# Patient Record
Sex: FEMALE | URBAN - METROPOLITAN AREA
[De-identification: names, ages, dates, MRNs, and addresses within clinical notes are randomized per-mention and may not be internally consistent; named-entity substitution may affect disease eponyms.]

---

## 2021-12-26 ENCOUNTER — EMERGENCY (EMERGENCY)
Facility: HOSPITAL | Age: 31
LOS: 1 days | Discharge: LEFT BEFORE DISCHARGE | End: 2021-12-26
Admitting: EMERGENCY MEDICINE
Payer: SELF-PAY

## 2021-12-26 VITALS
TEMPERATURE: 100 F | SYSTOLIC BLOOD PRESSURE: 140 MMHG | HEART RATE: 89 BPM | OXYGEN SATURATION: 98 % | DIASTOLIC BLOOD PRESSURE: 88 MMHG | RESPIRATION RATE: 18 BRPM

## 2021-12-26 PROCEDURE — L9991: CPT

## 2022-04-29 ENCOUNTER — APPOINTMENT (OUTPATIENT)
Dept: ANTEPARTUM | Facility: CLINIC | Age: 32
End: 2022-04-29

## 2022-04-29 PROBLEM — Z00.00 ENCOUNTER FOR PREVENTIVE HEALTH EXAMINATION: Status: ACTIVE | Noted: 2022-04-29

## 2022-05-24 ENCOUNTER — APPOINTMENT (OUTPATIENT)
Dept: MATERNAL FETAL MEDICINE | Facility: CLINIC | Age: 32
End: 2022-05-24

## 2022-05-26 ENCOUNTER — INPATIENT (INPATIENT)
Facility: HOSPITAL | Age: 32
LOS: 1 days | Discharge: LEFT BEFORE DISCHARGE | End: 2022-05-28
Attending: INTERNAL MEDICINE | Admitting: INTERNAL MEDICINE
Payer: MEDICAID

## 2022-05-26 VITALS
HEART RATE: 71 BPM | DIASTOLIC BLOOD PRESSURE: 64 MMHG | RESPIRATION RATE: 16 BRPM | TEMPERATURE: 97 F | OXYGEN SATURATION: 100 % | SYSTOLIC BLOOD PRESSURE: 119 MMHG

## 2022-05-26 DIAGNOSIS — Z29.9 ENCOUNTER FOR PROPHYLACTIC MEASURES, UNSPECIFIED: ICD-10-CM

## 2022-05-26 DIAGNOSIS — E80.6 OTHER DISORDERS OF BILIRUBIN METABOLISM: ICD-10-CM

## 2022-05-26 DIAGNOSIS — D64.9 ANEMIA, UNSPECIFIED: ICD-10-CM

## 2022-05-26 DIAGNOSIS — N39.0 URINARY TRACT INFECTION, SITE NOT SPECIFIED: ICD-10-CM

## 2022-05-26 DIAGNOSIS — R17 UNSPECIFIED JAUNDICE: ICD-10-CM

## 2022-05-26 LAB
ALBUMIN SERPL ELPH-MCNC: 3.7 G/DL — SIGNIFICANT CHANGE UP (ref 3.3–5)
ALP SERPL-CCNC: 48 U/L — SIGNIFICANT CHANGE UP (ref 40–120)
ALT FLD-CCNC: 18 U/L — SIGNIFICANT CHANGE UP (ref 4–33)
ANION GAP SERPL CALC-SCNC: 11 MMOL/L — SIGNIFICANT CHANGE UP (ref 7–14)
APTT BLD: 31.2 SEC — SIGNIFICANT CHANGE UP (ref 27–36.3)
AST SERPL-CCNC: 21 U/L — SIGNIFICANT CHANGE UP (ref 4–32)
BASOPHILS # BLD AUTO: 0.03 K/UL — SIGNIFICANT CHANGE UP (ref 0–0.2)
BASOPHILS NFR BLD AUTO: 0.3 % — SIGNIFICANT CHANGE UP (ref 0–2)
BILIRUB DIRECT SERPL-MCNC: 7.6 MG/DL — HIGH (ref 0–0.3)
BILIRUB INDIRECT FLD-MCNC: 1.2 MG/DL — HIGH (ref 0–1)
BILIRUB SERPL-MCNC: 8.8 MG/DL — HIGH (ref 0.2–1.2)
BILIRUB SERPL-MCNC: 8.8 MG/DL — HIGH (ref 0.2–1.2)
BUN SERPL-MCNC: 11 MG/DL — SIGNIFICANT CHANGE UP (ref 7–23)
CALCIUM SERPL-MCNC: 9.5 MG/DL — SIGNIFICANT CHANGE UP (ref 8.4–10.5)
CHLORIDE SERPL-SCNC: 104 MMOL/L — SIGNIFICANT CHANGE UP (ref 98–107)
CO2 SERPL-SCNC: 20 MMOL/L — LOW (ref 22–31)
CREAT ?TM UR-MCNC: 38 MG/DL — SIGNIFICANT CHANGE UP
CREAT SERPL-MCNC: 0.42 MG/DL — LOW (ref 0.5–1.3)
EGFR: 132 ML/MIN/1.73M2 — SIGNIFICANT CHANGE UP
EOSINOPHIL # BLD AUTO: 0.35 K/UL — SIGNIFICANT CHANGE UP (ref 0–0.5)
EOSINOPHIL NFR BLD AUTO: 3.2 % — SIGNIFICANT CHANGE UP (ref 0–6)
FLUAV AG NPH QL: SIGNIFICANT CHANGE UP
FLUBV AG NPH QL: SIGNIFICANT CHANGE UP
GLUCOSE SERPL-MCNC: 103 MG/DL — HIGH (ref 70–99)
HAPTOGLOB SERPL-MCNC: 41 MG/DL — SIGNIFICANT CHANGE UP (ref 34–200)
HAV IGM SER-ACNC: SIGNIFICANT CHANGE UP
HBV CORE IGM SER-ACNC: SIGNIFICANT CHANGE UP
HBV SURFACE AG SER-ACNC: REACTIVE
HCT VFR BLD CALC: 27.6 % — LOW (ref 34.5–45)
HCV AB S/CO SERPL IA: 0.12 S/CO — SIGNIFICANT CHANGE UP (ref 0–0.99)
HCV AB SERPL-IMP: SIGNIFICANT CHANGE UP
HGB BLD-MCNC: 9.5 G/DL — LOW (ref 11.5–15.5)
IANC: 7.88 K/UL — HIGH (ref 1.8–7.4)
IMM GRANULOCYTES NFR BLD AUTO: 0.7 % — SIGNIFICANT CHANGE UP (ref 0–1.5)
INR BLD: 1.05 RATIO — SIGNIFICANT CHANGE UP (ref 0.88–1.16)
LDH SERPL L TO P-CCNC: 155 U/L — SIGNIFICANT CHANGE UP (ref 135–225)
LDH SERPL L TO P-CCNC: 183 U/L — SIGNIFICANT CHANGE UP (ref 135–225)
LIDOCAIN IGE QN: 22 U/L — SIGNIFICANT CHANGE UP (ref 7–60)
LYMPHOCYTES # BLD AUTO: 19.2 % — SIGNIFICANT CHANGE UP (ref 13–44)
LYMPHOCYTES # BLD AUTO: 2.11 K/UL — SIGNIFICANT CHANGE UP (ref 1–3.3)
MCHC RBC-ENTMCNC: 31.7 PG — SIGNIFICANT CHANGE UP (ref 27–34)
MCHC RBC-ENTMCNC: 34.4 GM/DL — SIGNIFICANT CHANGE UP (ref 32–36)
MCV RBC AUTO: 92 FL — SIGNIFICANT CHANGE UP (ref 80–100)
MONOCYTES # BLD AUTO: 0.53 K/UL — SIGNIFICANT CHANGE UP (ref 0–0.9)
MONOCYTES NFR BLD AUTO: 4.8 % — SIGNIFICANT CHANGE UP (ref 2–14)
NEUTROPHILS # BLD AUTO: 7.88 K/UL — HIGH (ref 1.8–7.4)
NEUTROPHILS NFR BLD AUTO: 71.8 % — SIGNIFICANT CHANGE UP (ref 43–77)
NRBC # BLD: 0 /100 WBCS — SIGNIFICANT CHANGE UP
NRBC # FLD: 0 K/UL — SIGNIFICANT CHANGE UP
PLATELET # BLD AUTO: 232 K/UL — SIGNIFICANT CHANGE UP (ref 150–400)
POTASSIUM SERPL-MCNC: 3.8 MMOL/L — SIGNIFICANT CHANGE UP (ref 3.5–5.3)
POTASSIUM SERPL-SCNC: 3.8 MMOL/L — SIGNIFICANT CHANGE UP (ref 3.5–5.3)
PROT ?TM UR-MCNC: 9 MG/DL — SIGNIFICANT CHANGE UP
PROT ?TM UR-MCNC: 9 MG/DL — SIGNIFICANT CHANGE UP
PROT SERPL-MCNC: 6.6 G/DL — SIGNIFICANT CHANGE UP (ref 6–8.3)
PROT/CREAT UR-RTO: 0.2 RATIO — SIGNIFICANT CHANGE UP (ref 0–0.2)
PROTHROM AB SERPL-ACNC: 12.2 SEC — SIGNIFICANT CHANGE UP (ref 10.5–13.4)
RBC # BLD: 3 M/UL — LOW (ref 3.8–5.2)
RBC # FLD: 16.6 % — HIGH (ref 10.3–14.5)
RSV RNA NPH QL NAA+NON-PROBE: SIGNIFICANT CHANGE UP
SARS-COV-2 RNA SPEC QL NAA+PROBE: SIGNIFICANT CHANGE UP
SODIUM SERPL-SCNC: 135 MMOL/L — SIGNIFICANT CHANGE UP (ref 135–145)
WBC # BLD: 10.98 K/UL — HIGH (ref 3.8–10.5)
WBC # FLD AUTO: 10.98 K/UL — HIGH (ref 3.8–10.5)

## 2022-05-26 PROCEDURE — 99223 1ST HOSP IP/OBS HIGH 75: CPT | Mod: GC

## 2022-05-26 PROCEDURE — 76705 ECHO EXAM OF ABDOMEN: CPT | Mod: 26

## 2022-05-26 PROCEDURE — 99222 1ST HOSP IP/OBS MODERATE 55: CPT | Mod: GC

## 2022-05-26 PROCEDURE — 99221 1ST HOSP IP/OBS SF/LOW 40: CPT

## 2022-05-26 PROCEDURE — 99284 EMERGENCY DEPT VISIT MOD MDM: CPT

## 2022-05-26 RX ORDER — FERROUS SULFATE 325(65) MG
325 TABLET ORAL DAILY
Refills: 0 | Status: DISCONTINUED | OUTPATIENT
Start: 2022-05-26 | End: 2022-05-28

## 2022-05-26 RX ORDER — ENOXAPARIN SODIUM 100 MG/ML
40 INJECTION SUBCUTANEOUS EVERY 24 HOURS
Refills: 0 | Status: DISCONTINUED | OUTPATIENT
Start: 2022-05-26 | End: 2022-05-26

## 2022-05-26 RX ORDER — ERGOCALCIFEROL 1.25 MG/1
50000 CAPSULE ORAL DAILY
Refills: 0 | Status: DISCONTINUED | OUTPATIENT
Start: 2022-05-26 | End: 2022-05-26

## 2022-05-26 RX ORDER — INFLUENZA VIRUS VACCINE 15; 15; 15; 15 UG/.5ML; UG/.5ML; UG/.5ML; UG/.5ML
0.5 SUSPENSION INTRAMUSCULAR ONCE
Refills: 0 | Status: DISCONTINUED | OUTPATIENT
Start: 2022-05-26 | End: 2022-05-28

## 2022-05-26 RX ADMIN — Medication 325 MILLIGRAM(S): at 23:48

## 2022-05-26 NOTE — ED PROVIDER NOTE - CARE PLAN
Principal Discharge DX:	Elevated bilirubin   1 Principal Discharge DX:	Elevated bilirubin  Secondary Diagnosis:	Jaundice  Secondary Diagnosis:	19 weeks gestation of pregnancy

## 2022-05-26 NOTE — ED PROVIDER NOTE - CLINICAL SUMMARY MEDICAL DECISION MAKING FREE TEXT BOX
32 yo F with PMHX of childhood Hep B, and reports history in past of elevated bilirubin, here  19 weeks gestation sent in for evaluation of elevated bilirubin. Has noticed worsening yellowing of skin and eyes x few weeks. Some itching to abdomen. Mild nausea at times.   labs, RUQ artemio BATES cs

## 2022-05-26 NOTE — ED PROVIDER NOTE - OBJECTIVE STATEMENT
30 yo F with PMHX of childhood Hep B, and reports history in past of elevated bilirubin, here  19 weeks gestation sent in for evaluation of elevated bilirubin. Has noticed worsening yellowing of skin and eyes x few weeks. Some itching to abdomen. Mild nausea at times.   Denies fever, chills, vomiting, cp ,sob, pelvic pain, vaginal bleeding, discharge. Patient feels baby.  Patient reports 2 weeks ago did take Macrobid for UTI, 30 yo F with PMHX of childhood Hep B, and reports history in past of elevated bilirubin, here  19 weeks gestation sent in for evaluation of elevated bilirubin. Has noticed worsening yellowing of skin and eyes x few weeks. Some itching to abdomen. Mild nausea at times.   Denies fever, chills, vomiting, cp ,sob, pelvic pain, vaginal bleeding, discharge. Patient feels baby.  Patient reports 2 weeks ago did take Macrobid for UTI

## 2022-05-26 NOTE — PATIENT PROFILE ADULT - CHOOSE INDICATION TO IMMUNIZE (AN ORDER WILL BE GENERATED WHEN THIS NOTE IS SAVED):
Patient is pregnant regardless of trimester (administer thimerosal-free vaccine) Cimetidine Counseling:  I discussed with the patient the risks of Cimetidine including but not limited to gynecomastia, headache, diarrhea, nausea, drowsiness, arrhythmias, pancreatitis, skin rashes, psychosis, bone marrow suppression and kidney toxicity.

## 2022-05-26 NOTE — H&P ADULT - NSHPPHYSICALEXAM_GEN_ALL_CORE
VITALS:   T(C): 36.7 (05-26-22 @ 13:59), Max: 36.7 (05-26-22 @ 13:59)  HR: 73 (05-26-22 @ 13:59) (71 - 73)  BP: 119/72 (05-26-22 @ 13:59) (119/64 - 119/72)  RR: 17 (05-26-22 @ 13:59) (16 - 17)  SpO2: 100% (05-26-22 @ 13:59) (100% - 100%)    GENERAL: NAD, A&OX3  HEAD:  Atraumatic, normocephalic  EYES: EOMI, PERRLA, scleric icterus  ENT: Moist mucous membranes  NECK: Supple, no JVD  HEART: Regular rate and rhythm, no murmurs, rubs, or gallops  LUNGS: Unlabored respirations.  Clear to auscultation bilaterally, no crackles, wheezing, or rhonchi  ABDOMEN: distended due to pregnancy, tenderness to palpation at lower abdomen  EXTREMITIES: 2+ peripheral pulses bilaterally. No clubbing, cyanosis, or edema  NERVOUS SYSTEM:  A&Ox3, no focal deficits   SKIN: jaundiced

## 2022-05-26 NOTE — CONSULT NOTE ADULT - ASSESSMENT
Impression:  30 yo F w/ PMHx hepatitis B, 18 weeks pregnant presenting w/ painless jaundice    # jaundice - elevated bilirubin noted on labs, unclear duration of jaundice (to me said it was years, but told OBGYN it was recently) w/ recent prescription for nitrofurantion. Patient has history of longstanding untreated hepatitis B. It is unclear where the current labs fall in the trajectory of her symptoms; whether they represent an improvement from prior labs or not. Currently, she has no elevation in her AST/ALT/alk phos. Nitrofurantoin, though a known hepatotoxin, usually presents like an acute hepatitis, w/ elevated AST/ALT. Hepatitis B, if she is having an acute flair, should have elevated AST/ALT. Similarly if there were a super-imposed hepatitis Delta. We will need to assess the activity of her hepatitis B, as well as evaluate for evidence of cirrhosis (though RUQ US did not show any). If present, will then need to discuss role of EGD to evaluate for varices prior to delivery, and weight risks/benefits. Lastly, will want to rule out other etiologies for direct hyperbilirubinemia, such as biliary obstruction.     Recommendations:  - check HBVsAb, HBVsAg, HBVcAb, HBV PCR, HBV VL, Hepatitis delta, HIV, Hepatitis B e antigen, Hepatitis B e antibody  - MRI (without contrast) and MRCP  - trend liver enzymes, INR  - rest of care pending above    Hepatology will continue to follow.     All recommendations are tentative until note is attested by attending.     Teddy Escobar, PGY5  Gastroenterology/Hepatology Fellow  Available on Microsoft Teams  [phone] ([employer] Short Range Pager)  [phone] (Long Range Pager)    After 5pm, please contact the on-call GI fellow. [phone]

## 2022-05-26 NOTE — CONSULT NOTE ADULT - ATTENDING COMMENTS
Patient gives life long history of HBV and claims to note jaundice for last 10 years. Never treated for liver disease or hepatitis. Now pregnant in second trimester.  If jaundice related to HBV, I would expect patient to have cirrhosis.  Ultrasound did not define cirrhosis. Suggest noncontrast MRI with MRCP.  Viral hepatitis testing , including delta and Hepatitis E. Alphafetoprotein and quantitative HBV DNA by PCR.  Assess autoimmune markers.  Trend liver tests. No use of nitrofurantoin.  Family contacts to be assessed and protected from HBV if not infected.  Recommendations concerning treatment of HBV will depend on testing results.

## 2022-05-26 NOTE — H&P ADULT - ASSESSMENT
31  F 19wk PMH chronic hepatitis B presented [facility] due to jaundice, bilirubin 8.8. Considering family Hx and pt presentation, ddx include Dubin Paul (elevated bilirubin w/ normal AST/ALT and strong family Hx, associated w/ pregnancy), medication induced (nitro), hepatitis. Less likely will be ob related (HELLP, acute fatty liver) considering normal US. Will obtain workup for elevated bili and f/u w/ hepatology and MFM. Meanwhile managing medically to R/O UTI.

## 2022-05-26 NOTE — H&P ADULT - NSHPREVIEWOFSYSTEMS_GEN_ALL_CORE
CONSTITUTIONAL: +wkness, no fevers/chill  EYES/ENT: scleric icterus  NECK: No pain or stiffness  RESPIRATORY: +cough, no wheezing, hemoptysis; No shortness of breath  CARDIOVASCULAR: CP associated w/ coughing  GASTROINTESTINAL: +nausea, vomiting (pt attributed to pregnancy), no diarrhea, no constipation, no black stool, no blood in stool  GENITOURINARY: +dark urine  NEUROLOGICAL: No numbness or weakness  SKIN: No itching, burning, rashes, or lesions   PSYCH: no hx depression, no hx anxiety

## 2022-05-26 NOTE — CONSULT NOTE ADULT - SUBJECTIVE AND OBJECTIVE BOX
HPI:  32 yo F w/ PMHx hepatitis B, 18 weeks pregnant presenting of OB office for elevated bilirubin.     Per patient, she has known longstanding history of hepatitis B, as does her mother and multiple siblings. SHe has had jaundice since her 20s, with intermittent flares. She doesn't see doctors, and has never taken medications for her jaundice. She reports no abd pain, no nausea/vomiting, no melena/hematochezia/hematemesis. Had an EGD 2012 for unclear reasons (states to look at her liver) and doesn't know the results. She was recently diagnosed w/ a UTI and given nitrofurantoid for treatment. Per her conversation w/ OB, she apparently told them the jaundice started after the medication, however to me she stated it had been present for years.     On presentation, patient HDS, labs w/ nl BMP, T bili 8.8, D bili 7.6. RUQ US wnl.       Allergies:  No Known Allergies      Home Medications:    Hospital Medications:      PMHX/PSHX:  Hepatitis B        Family history:      Denies family history of colon cancer/polyps, stomach cancer/polyps, pancreatic cancer/masses, liver cancer/disease, ovarian cancer and endometrial cancer.    Social History:   Tob: Denies  EtOH: Denies  Illicit Drugs: Denies    ROS:     General:  No wt loss, fevers, chills, night sweats, fatigue  Eyes:  Good vision, no reported pain  ENT:  No sore throat, pain, runny nose, dysphagia  CV:  No pain, palpitations, hypo/hypertension  Pulm:  No dyspnea, cough, tachypnea, wheezing  GI:  see HPI  :  No pain, bleeding, incontinence, nocturia  Muscle:  No pain, weakness  Neuro:  No weakness, tingling, memory problems  Psych:  No fatigue, insomnia, mood problems, depression  Endocrine:  No polyuria, polydipsia, cold/heat intolerance  Heme:  No petechiae, ecchymosis, easy bruisability  Skin:  No rash, tattoos, scars, edema    PHYSICAL EXAM:     GENERAL:  No acute distress  HEENT:  NCAT, + scleral icterus   CHEST:  no respiratory distress  HEART:  Regular rate and rhythm  ABDOMEN:  gravid, Soft, non-tender, non-distended, normoactive bowel sounds,  no masses  EXTREMITIES: No edema  SKIN:  No rash/erythema/ecchymoses/petechiae/wounds/abscess/warm/dry  NEURO:  Alert and oriented x 3, no asterixis    Vital Signs:  Vital Signs Last 24 Hrs  T(C): 36.7 (26 May 2022 13:59), Max: 36.7 (26 May 2022 13:59)  T(F): 98.1 (26 May 2022 13:59), Max: 98.1 (26 May 2022 13:59)  HR: 73 (26 May 2022 13:59) (71 - 73)  BP: 119/72 (26 May 2022 13:59) (119/64 - 119/72)  BP(mean): --  RR: 17 (26 May 2022 13:59) (16 - 17)  SpO2: 100% (26 May 2022 13:59) (100% - 100%)  Daily     Daily     LABS:                        9.5    10.98 )-----------( 232      ( 26 May 2022 12:17 )             27.6     Mean Cell Volume: 92.0 fL (05-26-22 @ 12:17)    05-26    135  |  104  |  11  ----------------------------<  103<H>  3.8   |  20<L>  |  0.42<L>    Ca    9.5      26 May 2022 12:17    TPro  6.6  /  Alb  3.7  /  TBili  8.8<H>  /  DBili  7.6<H>  /  AST  21  /  ALT  18  /  AlkPhos  48  05-26    LIVER FUNCTIONS - ( 26 May 2022 12:17 )  Alb: 3.7 g/dL / Pro: 6.6 g/dL / ALK PHOS: 48 U/L / ALT: 18 U/L / AST: 21 U/L / GGT: x           PT/INR - ( 26 May 2022 16:00 )   PT: 12.2 sec;   INR: 1.05 ratio         PTT - ( 26 May 2022 16:00 )  PTT:31.2 sec    Amylase Serum--      Lipase serum22       Ammonia--                          9.5    10.98 )-----------( 232      ( 26 May 2022 12:17 )             27.6       Imaging:  [facility] US 5/26/22  FINDINGS:  Liver: Within normal limits.  Bile ducts: Normal caliber. Common bile duct measures 2 mm.  Gallbladder: Contracted. Within normal limits.  Pancreas: Visualized portions are within normal limits.  Right kidney: 11.4 cm. No hydronephrosis.  Ascites: None.  IVC: Visualized portions are within normal limits.    IMPRESSION:  Normal right upper quadrant abdominal ultrasound.          
LIONEL CURRY  31y  Female [id number]    HPI: 30yo  at 18w5d presenting from OBGYN office found to have elevated bilirubin, scleral icterus and jaundice for the past two weeks. States this has happened to her in the past but no inciting events were noted. Currently has no abdominal pain, fevers, CP, SOB. Two weeks ago was diagnosed with UTI and given Nitrofurantoin for treatment, after which "yellow eyes developed". Pt does have family history s/f similar problem. States sister and mom () have "this problem" and "show" with yellow eyes. She has three other sisters and brother who never had scleral icterus. She used to see a GI doctor in  and had endoscopy performed, no acute findings noted.     Name of GYN Physician: Dr.Victoria Darnell  POB:   Pgyn: Denies fibroids, cysts, endometriosis, STI's, Abnormal pap smears     Allergies  No Known Allergies    PAST MEDICAL & SURGICAL HISTORY:  Congential Hepatitis B    Vital Signs Last 24 Hrs  T(C): 36.7 (26 May 2022 13:59), Max: 36.7 (26 May 2022 13:59)  T(F): 98.1 (26 May 2022 13:59), Max: 98.1 (26 May 2022 13:59)  HR: 73 (26 May 2022 13:59) (71 - 73)  BP: 119/72 (26 May 2022 13:59) (119/64 - 119/72)  BP(mean): --  RR: 17 (26 May 2022 13:59) (16 - 17)  SpO2: 100% (26 May 2022 13:59) (100% - 100%)    Physical Exam:   General: sitting comftorably in bed, NAD   HEENT: neck supple, full ROM. Scleral icterus  CV: RR S1S2 no m/r/g  Lungs: CTA b/l, good air flow b/l   Back: No CVA tenderness  Abd: Soft, non-tender  Ext: non-tender b/l, no edema     LABS:                          9.5    10.98 )-----------( 232      ( 26 May 2022 12:17 )             27.6     05-    135  |  104  |  11  ----------------------------<  103<H>  3.8   |  20<L>  |  0.42<L>    Ca    9.5      26 May 2022 12:17    TPro  6.6  /  Alb  3.7  /  TBili  8.8<H>  /  DBili  7.6<H>  /  AST  21  /  ALT  18  /  AlkPhos  48      RADIOLOGY & ADDITIONAL STUDIES:  < from: US Abdomen Upper Quadrant Right (22 @ 12:28) >  ACC: [id number] EXAM:  US ABDOMEN RT UPR QUADRANT                          PROCEDURE DATE:  2022          INTERPRETATION:  CLINICAL INFORMATION: Abnormal liver function tests.    COMPARISON: None available.    TECHNIQUE: Sonography of the rightupper quadrant.    FINDINGS:  Liver: Within normal limits.  Bile ducts: Normal caliber. Common bile duct measures 2 mm.  Gallbladder: Contracted. Within normal limits.  Pancreas: Visualized portions are within normal limits.  Right kidney: 11.4 cm. No hydronephrosis.  Ascites: None.  IVC: Visualized portions are within normal limits.    IMPRESSION:  Normal right upper quadrant abdominal ultrasound.    --- End of Report ---    SHANNON WILSON MD; Attending Radiologist  This document has been electronically signed. May 26 2022  1:22PM    < end of copied text >

## 2022-05-26 NOTE — H&P ADULT - PROBLEM SELECTOR PLAN 1
- pt has family hx of jaundice, personal Hx of chronic hepatitis  - at admission, bili elevated (direct >> indirect), AST/ALT normal  - suspecting Dubin Paul vs drug induced vs hepatitis  - has outpt record that pt has +ve hepatitis B surface Ag and -ve surface Ab  - will f/u hepatology recs for blood tests  - US abdomen benign

## 2022-05-26 NOTE — CONSULT NOTE ADULT - ASSESSMENT
30yo  at 18w5d presenting from OBGYN office found to have elevated bilirubin, scleral icterus and jaundice for the past two weeks and also on nitrofurantoin. Pt has chronic HepB infection and FMHx of jaundice and scleral icterus in family members.  - Low concern for Acute Fatty Liver of pregnancy or HELLP or pre-eclampsia at this time  - Rec Medicine/GI consult and recommendations  - Rec Hepatitis Panel, Blood Smear  - If patient is admitted, will have ATU Sonogram performed for pregnancy.      seen with MFM Fellow, Dr.Quinn Mike Craig PGY3   32yo  at 18w5d presenting from OBGYN office found to have elevated bilirubin, scleral icterus and jaundice for the past two weeks and also on nitrofurantoin. Pt has chronic HepB infection and FMHx of jaundice and scleral icterus in family members.  - Low concern for Acute Fatty Liver of pregnancy or HELLP or pre-eclampsia at this time  - Rec Medicine/GI consult and recommendations  - Rec Hepatitis Panel, Blood Smear  - If patient is admitted, will have ATU Sonogram performed for pregnancy.      seen with MFM Fellow, Dr.Quinn Mike Craig PGY3         MFM Fellow Addendum    32yo P0 at 18w5d by FTS presenting w/ isolated hyperbilirubinemia of unknown etiology. VSS. Pt asxs. Sig scleral icterus. VSS. DDx broad likely inherited disorder of bilirubin conjugation (strong family history) vs hemolytic disease given relative anemia. Agree with medicine and hepatology consult. Pt would also benefit from genetics consultation. No OB intervention at this time. Will continue to follow. Please reach out with any questions    Patient d/w Dr. Goldsmith (M attending)    Avery Hall M.D. PGY-5  Maternal Fetal Medicine Fellow  Cell: [phone] if after 5pm or weekend ask labor and delivery for on call fellow

## 2022-05-26 NOTE — H&P ADULT - VTE RISK ASSESSMENT
Anxiety 3/10  Depression 0/10  Pleasant during assessment  States she is positive about pending discharge  Discussed paper issues  Out in community, but retreats back to room  Positive for snack and medications  Good understanding of medications  Denies any suicidal ideations, homicidal thoughts or hallucinations  Remains on q 7 minute checks  <<--- Click to launch

## 2022-05-26 NOTE — H&P ADULT - NSHPLABSRESULTS_GEN_ALL_CORE
05-26    135  |  104  |  11  ----------------------------<  103<H>  3.8   |  20<L>  |  0.42<L>    Ca    9.5      26 May 2022 12:17    TPro  6.6  /  Alb  3.7  /  TBili  8.8<H>  /  DBili  7.6<H>  /  AST  21  /  ALT  18  /  AlkPhos  48  05-26      PT/INR - ( 26 May 2022 16:00 )   PT: 12.2 sec;   INR: 1.05 ratio         PTT - ( 26 May 2022 16:00 )  PTT:31.2 sec                                        9.5    10.98 )-----------( 232      ( 26 May 2022 12:17 )             27.6     CAPILLARY BLOOD GLUCOSE

## 2022-05-26 NOTE — ED PROVIDER NOTE - PROGRESS NOTE DETAILS
SANG Joe: I spoke with Dr. Eddi Herrera who sent patient in to ER to have OB and hepatology as bilirubin was 9.7 and direct bilirubin and concern for patient that is 19 weeks pregnant. SPoke with lab will add on M diff/ smear Spoke with Philip ELIZONDO MD, bilirubin of 8.8 at this time warrants admission and work up, reports to and on pt/inr and hepatitis and other blood work recs would be in a consult note. Will admit to Dr. Lira

## 2022-05-26 NOTE — PATIENT PROFILE ADULT - FALL HARM RISK - UNIVERSAL INTERVENTIONS
Bed in lowest position, wheels locked, appropriate side rails in place/Call bell, personal items and telephone in reach/Instruct patient to call for assistance before getting out of bed or chair/Non-slip footwear when patient is out of bed/[place] to call system/Physically safe environment - no spills, clutter or unnecessary equipment/Purposeful Proactive Rounding/Room/bathroom lighting operational, light cord in reach

## 2022-05-26 NOTE — H&P ADULT - PROBLEM SELECTOR PLAN 3
- stable (baseline 9.7, admission 9.5)  - pt w/ DJ can present w/ HS  - iron deficiency unlikely considering that pt is on iron pills daily  - can be also pregnancy associated anemia  - asymptomatic will monitor

## 2022-05-26 NOTE — ED ADULT NURSE NOTE - OBJECTIVE STATEMENT
Receive pt. in Intake room 12 alert and oriented x 4, presenting to the ER sent by her Ob-gyn for elevated liver function. Pt. stated "I am 19 weeks pregnant and my doctor sent me to the ER for elevated bilirubin". Pt. has jaundice color eyes, no c/o pain , nausea or vomiting. Labs sent , call bell place within reach.

## 2022-05-26 NOTE — ED PROVIDER NOTE - ATTENDING APP SHARED VISIT CONTRIBUTION OF CARE
Dr. Hernandez:  I performed a face to face bedside interview with patient regarding history of present illness, review of symptoms and past medical history. I completed an independent physical exam.  I have discussed patient's plan of care with PA.   I agree with note as stated above, having amended the EMR as needed to reflect my findings.   This includes HISTORY OF PRESENT ILLNESS, HIV, PAST MEDICAL/SURGICAL/FAMILY/SOCIAL HISTORY, ALLERGIES AND HOME MEDICATIONS, REVIEW OF SYSTEMS, PHYSICAL EXAM, and any PROGRESS NOTES during the time I functioned as the attending physician for this patient.    31F h/o childhood HBV, reportedly with h/o elevated bilirubin (in her early 20's), at  19wks gestation, sent in by GI doctor for elevated bilirubin on outpatient labs.  Pt endorses increased yellowing of skin/eyes over past couple weeks.  Of note, recently had a course of Macrobid for UTI.  Mild nausea intermittently.  Denies fever/chills, cp, sob, v/d.    Exam:  - nad  - +scleral icterus  - rrr  - ctab   -abd gravid, mild epigastric TTP    A/P  - elevated bilirubin, check levels today  - cbc, cmp, RUQ US  - appreciate GI/OB recs

## 2022-05-26 NOTE — H&P ADULT - NSICDXFAMILYHX_GEN_ALL_CORE_FT
FAMILY HISTORY:  Father  Still living? Unknown  FH: CAD (coronary artery disease), Age at diagnosis: Age Unknown  FH: diabetes mellitus, Age at diagnosis: Age Unknown  FH: hypertension, Age at diagnosis: Age Unknown

## 2022-05-26 NOTE — H&P ADULT - ATTENDING COMMENTS
31W  19w pregnant with PMH significant for but not limited to chronic hepatitis B (from birth?) who is presenting from home with isolated hyperbilirubinemia. Reporting long term hyperbilirubinemia with +FH. Seen by hepatology, concern for DILI from Macrobid, although seems unlikely with only isolated direct hyperbilirubinemia. Pending MRCP to r/o billary obstruction, although this seems very unlikely without symptoms and normal RUQ US. Inherited disorders are also on differential will f/u with patient's OP hepatologist regarding prior w/u.

## 2022-05-26 NOTE — PATIENT PROFILE ADULT - NSPROPTRIGHTBILLOFRIGHTS_GEN_A_NUR
Spiritual Care Partner Volunteer visited patient in Ortho on 8/13/2018. Documented by:  SUZIE Alberto patient representative

## 2022-05-26 NOTE — ED PROVIDER NOTE - NS ED ATTENDING STATEMENT MOD
This was a shared visit with the BRETT. I reviewed and verified the documentation and independently performed the documented:

## 2022-05-26 NOTE — CONSULT NOTE ADULT - ATTENDING COMMENTS
ERIN Attending note.    Pt seen and examined and I fully participated in the care and evaluation.  Agree with the above assessment, evaluation and recommendation as documented by the Fellow.  Outpatient referrals as noted above is recommended.    MD ERIN Doherty

## 2022-05-26 NOTE — H&P ADULT - HISTORY OF PRESENT ILLNESS
31  F 19wk PMH chronic hepatitis B presented to [facility] due to jaundice, bilirubin 8.8. Pt reported that she was born w/ jaundice, and so is her mother who is now  due to ?cancer and sister. She underwent workup in 2012 for jaundice, underwent an endoscopy and was told that results were normal. She reported that her skin is usually not too itchy and the current condition is at her baseline. She noticed that she is getting more jaundiced as she advances through pregnancy. She was given nitrofurantion 2-3 wks ago due to her +UA outpt (record shows 26908-03022 cfu/ml E. Faecalis sensitive to nitro). She stopped taking the meds 1wk ago. At her baseline her urine ranges from light yellow to dark w/ specks of blood. She has a chronic Hx of hepatitis B that is untreated (outpt shows HepB surface Ag +ve and surface Ab -ve). Reports a normal BM pattern.     Pt was seen by OBGYN and hepatology at the ED, w/ low suspicion of HELLP or acute fatty liver. Admitted for further workup of elevated bili. Stable anemia at 9.5, elevated bili w/ normal AST/ALT. US abd benign. VSS.

## 2022-05-26 NOTE — H&P ADULT - PROBLEM SELECTOR PLAN 2
- Hx of UTI, treated w/ nitro  - however physical exam has signs of abd tenderness at lower  - UA to r/o UTI  - if +ve will treat w/ ?ceftriaxone considering that nitro can cause liver injury

## 2022-05-27 ENCOUNTER — APPOINTMENT (OUTPATIENT)
Dept: ANTEPARTUM | Facility: HOSPITAL | Age: 32
End: 2022-05-27
Payer: MEDICAID

## 2022-05-27 ENCOUNTER — ASOB RESULT (OUTPATIENT)
Age: 32
End: 2022-05-27

## 2022-05-27 LAB
AFP-TM SERPL-MCNC: 68.3 NG/ML — HIGH
ALBUMIN SERPL ELPH-MCNC: 3.4 G/DL — SIGNIFICANT CHANGE UP (ref 3.3–5)
ALP SERPL-CCNC: 43 U/L — SIGNIFICANT CHANGE UP (ref 40–120)
ALT FLD-CCNC: 19 U/L — SIGNIFICANT CHANGE UP (ref 4–33)
ANION GAP SERPL CALC-SCNC: 11 MMOL/L — SIGNIFICANT CHANGE UP (ref 7–14)
APTT BLD: 30 SEC — SIGNIFICANT CHANGE UP (ref 27–36.3)
AST SERPL-CCNC: 19 U/L — SIGNIFICANT CHANGE UP (ref 4–32)
BASOPHILS # BLD AUTO: 0.05 K/UL — SIGNIFICANT CHANGE UP (ref 0–0.2)
BASOPHILS NFR BLD AUTO: 0.5 % — SIGNIFICANT CHANGE UP (ref 0–2)
BILIRUB SERPL-MCNC: 8 MG/DL — HIGH (ref 0.2–1.2)
BUN SERPL-MCNC: 10 MG/DL — SIGNIFICANT CHANGE UP (ref 7–23)
CALCIUM SERPL-MCNC: 9.1 MG/DL — SIGNIFICANT CHANGE UP (ref 8.4–10.5)
CHLORIDE SERPL-SCNC: 104 MMOL/L — SIGNIFICANT CHANGE UP (ref 98–107)
CO2 SERPL-SCNC: 19 MMOL/L — LOW (ref 22–31)
CREAT SERPL-MCNC: 0.46 MG/DL — LOW (ref 0.5–1.3)
EGFR: 130 ML/MIN/1.73M2 — SIGNIFICANT CHANGE UP
EOSINOPHIL # BLD AUTO: 0.47 K/UL — SIGNIFICANT CHANGE UP (ref 0–0.5)
EOSINOPHIL NFR BLD AUTO: 4.3 % — SIGNIFICANT CHANGE UP (ref 0–6)
FOLATE SERPL-MCNC: 15.9 NG/ML — SIGNIFICANT CHANGE UP (ref 3.1–17.5)
GLUCOSE SERPL-MCNC: 84 MG/DL — SIGNIFICANT CHANGE UP (ref 70–99)
HBV DNA # SERPL NAA+PROBE: 858 IU/ML — SIGNIFICANT CHANGE UP
HBV DNA # SERPL NAA+PROBE: DETECTED
HBV DNA SERPL NAA+PROBE-LOG#: 2.93 LOGIU/ML — SIGNIFICANT CHANGE UP
HBV E AG SER-ACNC: SIGNIFICANT CHANGE UP
HBV SURFACE AB SER-ACNC: SIGNIFICANT CHANGE UP
HBV SURFACE AG SER-ACNC: REACTIVE
HBV SURFACE AG SERPL QL IA: REACTIVE
HCT VFR BLD CALC: 25.6 % — LOW (ref 34.5–45)
HGB BLD-MCNC: 8.4 G/DL — LOW (ref 11.5–15.5)
IANC: 7.27 K/UL — SIGNIFICANT CHANGE UP (ref 1.8–7.4)
IMM GRANULOCYTES NFR BLD AUTO: 0.8 % — SIGNIFICANT CHANGE UP (ref 0–1.5)
INR BLD: 1.07 RATIO — SIGNIFICANT CHANGE UP (ref 0.88–1.16)
IRON SATN MFR SERPL: 105 UG/DL — SIGNIFICANT CHANGE UP (ref 30–160)
IRON SATN MFR SERPL: 28 % — SIGNIFICANT CHANGE UP (ref 14–50)
LYMPHOCYTES # BLD AUTO: 2.48 K/UL — SIGNIFICANT CHANGE UP (ref 1–3.3)
LYMPHOCYTES # BLD AUTO: 22.5 % — SIGNIFICANT CHANGE UP (ref 13–44)
MAGNESIUM SERPL-MCNC: 1.6 MG/DL — SIGNIFICANT CHANGE UP (ref 1.6–2.6)
MCHC RBC-ENTMCNC: 31.6 PG — SIGNIFICANT CHANGE UP (ref 27–34)
MCHC RBC-ENTMCNC: 32.8 GM/DL — SIGNIFICANT CHANGE UP (ref 32–36)
MCV RBC AUTO: 96.2 FL — SIGNIFICANT CHANGE UP (ref 80–100)
MONOCYTES # BLD AUTO: 0.66 K/UL — SIGNIFICANT CHANGE UP (ref 0–0.9)
MONOCYTES NFR BLD AUTO: 6 % — SIGNIFICANT CHANGE UP (ref 2–14)
NEUTROPHILS # BLD AUTO: 7.27 K/UL — SIGNIFICANT CHANGE UP (ref 1.8–7.4)
NEUTROPHILS NFR BLD AUTO: 65.9 % — SIGNIFICANT CHANGE UP (ref 43–77)
NRBC # BLD: 0 /100 WBCS — SIGNIFICANT CHANGE UP
NRBC # FLD: 0 K/UL — SIGNIFICANT CHANGE UP
PHOSPHATE SERPL-MCNC: 3.6 MG/DL — SIGNIFICANT CHANGE UP (ref 2.5–4.5)
PLATELET # BLD AUTO: 228 K/UL — SIGNIFICANT CHANGE UP (ref 150–400)
POTASSIUM SERPL-MCNC: 3.7 MMOL/L — SIGNIFICANT CHANGE UP (ref 3.5–5.3)
POTASSIUM SERPL-SCNC: 3.7 MMOL/L — SIGNIFICANT CHANGE UP (ref 3.5–5.3)
PROT SERPL-MCNC: 6.1 G/DL — SIGNIFICANT CHANGE UP (ref 6–8.3)
PROTHROM AB SERPL-ACNC: 12.4 SEC — SIGNIFICANT CHANGE UP (ref 10.5–13.4)
RBC # BLD: 2.66 M/UL — LOW (ref 3.8–5.2)
RBC # BLD: 2.66 M/UL — LOW (ref 3.8–5.2)
RBC # FLD: 16.8 % — HIGH (ref 10.3–14.5)
RETICS #: 159.3 K/UL — HIGH (ref 25–125)
RETICS/RBC NFR: 6 % — HIGH (ref 0.5–2.5)
SODIUM SERPL-SCNC: 134 MMOL/L — LOW (ref 135–145)
TIBC SERPL-MCNC: 378 UG/DL — SIGNIFICANT CHANGE UP (ref 220–430)
UIBC SERPL-MCNC: 273 UG/DL — SIGNIFICANT CHANGE UP (ref 110–370)
VIT B12 SERPL-MCNC: 525 PG/ML — SIGNIFICANT CHANGE UP (ref 200–900)
WBC # BLD: 11.02 K/UL — HIGH (ref 3.8–10.5)
WBC # FLD AUTO: 11.02 K/UL — HIGH (ref 3.8–10.5)

## 2022-05-27 PROCEDURE — 99233 SBSQ HOSP IP/OBS HIGH 50: CPT | Mod: GC

## 2022-05-27 PROCEDURE — 76815 OB US LIMITED FETUS(S): CPT | Mod: 26

## 2022-05-27 PROCEDURE — 99232 SBSQ HOSP IP/OBS MODERATE 35: CPT | Mod: GC

## 2022-05-27 RX ADMIN — Medication 1 TABLET(S): at 12:52

## 2022-05-27 RX ADMIN — Medication 325 MILLIGRAM(S): at 12:51

## 2022-05-27 NOTE — PROGRESS NOTE ADULT - PROBLEM SELECTOR PLAN 1
- pt has family hx of jaundice, personal Hx of chronic hepatitis  - at admission, bili elevated (direct >> indirect), AST/ALT normal  - suspecting Dubin Paul vs drug induced vs hepatitis  - has outpt record that pt has +ve hepatitis B surface Ag and -ve surface Ab  - will f/u hepatology recs for blood tests  - US abdomen benign - pt has family hx of jaundice, personal Hx of chronic hepatitis  - at admission, bili elevated (direct >> indirect), AST/ALT normal  - suspecting Dubin Johnson vs drug induced vs hepatitis  - will send urine porphyrin fractionated  - has outpt record that pt has +ve hepatitis B surface Ag and -ve surface Ab  - will f/u hepatology recs for blood tests: chronic hep B w/ low viral load  - US abdomen benign

## 2022-05-27 NOTE — PROGRESS NOTE ADULT - PROBLEM SELECTOR PLAN 4
- DVT: will hold off due to pregnancy  - dispo: home  - diet: regular - DVT: ambulatory  - dispo: home  - diet: regular

## 2022-05-27 NOTE — PROGRESS NOTE ADULT - ASSESSMENT
Impression:  32 yo F w/ PMHx hepatitis B, 18 weeks pregnant presenting w/ painless jaundice    # jaundice - elevated bilirubin noted on labs, unclear duration of jaundice (to me said it was years, but told OBGYN it was recently) w/ recent prescription for nitrofurantion. Patient has history of longstanding untreated hepatitis B. It is unclear where the current labs fall in the trajectory of her symptoms; whether they represent an improvement from prior labs or not. Currently, she has no elevation in her AST/ALT/alk phos. Nitrofurantoin, though a known hepatotoxin, usually presents like an acute hepatitis, w/ elevated AST/ALT. Hepatitis B, if she is having an acute flair, should have elevated AST/ALT. Similarly if there were a super-imposed hepatitis Delta. We will need to assess the activity of her hepatitis B, as well as evaluate for evidence of cirrhosis (though RUQ US did not show any). If present, will then need to discuss role of EGD to evaluate for varices prior to delivery, and weight risks/benefits. Lastly, will want to rule out other etiologies for direct hyperbilirubinemia, such as biliary obstruction.     Recommendations:  - check HBVsAb, HBVsAg, HBVcAb, HBV PCR, HBV VL, Hepatitis delta, HIV, Hepatitis B e antigen, Hepatitis B e antibody  - MRI (without contrast) and MRCP  - check autoimmune markers for liver disease: SHAVON, anti-mitochondrial antibody, anti-smooth muscle antibody, anti-liver kidney antibody, immunoglobulins (IgG, IgM, IgA quantitative) for autoimmune etiologies   - check AFP  - check urinary coproporphyrin to evaluate for possible dubin-ivon syndrome  - trend liver enzymes, INR  - rest of care pending above    Hepatology will continue to follow.     All recommendations are tentative until note is attested by attending.     Teddy Escobar, PGY5  Gastroenterology/Hepatology Fellow  Available on Microsoft Teams  [phone] ([facility] Short Range Pager)  [phone] (Long Range Pager)    After 5pm, please contact the on-call GI fellow. [phone]

## 2022-05-27 NOTE — PROGRESS NOTE ADULT - SUBJECTIVE AND OBJECTIVE BOX
PROGRESS NOTE:     Patient is a 31y old  Female who presents with a chief complaint of jaundice (26 May 2022 17:03)      SUBJECTIVE / OVERNIGHT EVENTS:    ADDITIONAL REVIEW OF SYSTEMS:    MEDICATIONS  (STANDING):  ferrous    sulfate 325 milliGRAM(s) Oral daily  influenza   Vaccine 0.5 milliLiter(s) IntraMuscular once  prenatal multivitamin 1 Tablet(s) Oral daily    MEDICATIONS  (PRN):      CAPILLARY BLOOD GLUCOSE        I&O's Summary      PHYSICAL EXAM:  Vital Signs Last 24 Hrs  T(C): 37 (27 May 2022 05:18), Max: 37.1 (26 May 2022 21:00)  T(F): 98.6 (27 May 2022 05:18), Max: 98.8 (26 May 2022 21:00)  HR: 78 (27 May 2022 05:18) (71 - 82)  BP: 107/65 (27 May 2022 05:18) (104/61 - 119/72)  BP(mean): --  RR: 17 (27 May 2022 05:18) (16 - 17)  SpO2: 98% (27 May 2022 05:18) (98% - 100%)    CONSTITUTIONAL: NAD, well-developed  HEENT: normal conjunctiva, PEERLA  RESPIRATORY: Normal respiratory effort; lungs are clear to auscultation bilaterally  CARDIOVASCULAR: Regular rate and rhythm, normal S1 and S2, no murmur/rub/gallop;   ABDOMEN: No tenderness to palpation at all four quadrants, +BS  MUSCULOSKELETAL no clubbing or cyanosis of digits; no joint swelling or tenderness to palpation  EXTREMITIES No lower extremity edema; Peripheral pulses are 2+ bilaterally  SKIN: well-perfused, no dry skin    LABS:                        8.4    11.02 )-----------( 228      ( 27 May 2022 05:50 )             25.6     05-26    135  |  104  |  11  ----------------------------<  103<H>  3.8   |  20<L>  |  0.42<L>    Ca    9.5      26 May 2022 12:17    TPro  6.6  /  Alb  3.7  /  TBili  8.8<H>  /  DBili  7.6<H>  /  AST  21  /  ALT  18  /  AlkPhos  48  05-26    PT/INR - ( 27 May 2022 05:50 )   PT: 12.4 sec;   INR: 1.07 ratio         PTT - ( 27 May 2022 05:50 )  PTT:30.0 sec            RADIOLOGY & ADDITIONAL TESTS:  Results Reviewed:   Imaging Personally Reviewed:  Electrocardiogram Personally Reviewed:    COORDINATION OF CARE:  Care Discussed with Consultants/Other Providers [Y/N]:  Prior or Outpatient Records Reviewed [Y/N]:   PROGRESS NOTE:     Patient is a 31y old  Female who presents with a chief complaint of jaundice (26 May 2022 17:03)      SUBJECTIVE / OVERNIGHT EVENTS: NAEON VSS. Awaiting MRI.    ADDITIONAL REVIEW OF SYSTEMS: -ve    MEDICATIONS  (STANDING):  ferrous    sulfate 325 milliGRAM(s) Oral daily  influenza   Vaccine 0.5 milliLiter(s) IntraMuscular once  prenatal multivitamin 1 Tablet(s) Oral daily    MEDICATIONS  (PRN):      CAPILLARY BLOOD GLUCOSE        I&O's Summary      PHYSICAL EXAM:  Vital Signs Last 24 Hrs  T(C): 37 (27 May 2022 05:18), Max: 37.1 (26 May 2022 21:00)  T(F): 98.6 (27 May 2022 05:18), Max: 98.8 (26 May 2022 21:00)  HR: 78 (27 May 2022 05:18) (71 - 82)  BP: 107/65 (27 May 2022 05:18) (104/61 - 119/72)  BP(mean): --  RR: 17 (27 May 2022 05:18) (16 - 17)  SpO2: 98% (27 May 2022 05:18) (98% - 100%)    CONSTITUTIONAL: NAD, well-developed  HEENT: scleric icterus  RESPIRATORY: Normal respiratory effort; lungs are clear to auscultation bilaterally  CARDIOVASCULAR: Regular rate and rhythm, normal S1 and S2, no murmur/rub/gallop;   ABDOMEN: No tenderness to palpation at all four quadrants, +BS  MUSCULOSKELETAL no clubbing or cyanosis of digits; no joint swelling or tenderness to palpation  EXTREMITIES No lower extremity edema; Peripheral pulses are 2+ bilaterally  SKIN: jaundiced    LABS:                        8.4    11.02 )-----------( 228      ( 27 May 2022 05:50 )             25.6     05-26    135  |  104  |  11  ----------------------------<  103<H>  3.8   |  20<L>  |  0.42<L>    Ca    9.5      26 May 2022 12:17    TPro  6.6  /  Alb  3.7  /  TBili  8.8<H>  /  DBili  7.6<H>  /  AST  21  /  ALT  18  /  AlkPhos  48  05-26    PT/INR - ( 27 May 2022 05:50 )   PT: 12.4 sec;   INR: 1.07 ratio         PTT - ( 27 May 2022 05:50 )  PTT:30.0 sec            RADIOLOGY & ADDITIONAL TESTS:  Results Reviewed:   Imaging Personally Reviewed:  Electrocardiogram Personally Reviewed:    COORDINATION OF CARE:  Care Discussed with Consultants/Other Providers [Y/N]:  Prior or Outpatient Records Reviewed [Y/N]:

## 2022-05-27 NOTE — PROGRESS NOTE ADULT - SUBJECTIVE AND OBJECTIVE BOX
Gastroenterology/Hepatology Progress Note      Interval Events:   - no acute events overnight  - AM labs w/ T bili 8, HBVsAg+, HBVsAb-    Allergies:  No Known Allergies      Hospital Medications:  ferrous    sulfate 325 milliGRAM(s) Oral daily  influenza   Vaccine 0.5 milliLiter(s) IntraMuscular once  prenatal multivitamin 1 Tablet(s) Oral daily      ROS: 14 point ROS negative unless otherwise state in subjective    PHYSICAL EXAM:   Vital Signs:  Vital Signs Last 24 Hrs  T(C): 37 (27 May 2022 05:18), Max: 37.1 (26 May 2022 21:00)  T(F): 98.6 (27 May 2022 05:18), Max: 98.8 (26 May 2022 21:00)  HR: 78 (27 May 2022 05:18) (71 - 82)  BP: 107/65 (27 May 2022 05:18) (104/61 - 119/72)  BP(mean): --  RR: 17 (27 May 2022 05:18) (16 - 17)  SpO2: 98% (27 May 2022 05:18) (98% - 100%)  Daily Height in cm: 149.86 (26 May 2022 22:52)    Daily     GENERAL:  No acute distress  HEENT:  NCAT, + scleral icterus  CHEST: no resp distress  HEART:  RRR  ABDOMEN:  gravid, Soft, non-tender, non-distended, normoactive bowel sounds, no masses  EXTREMITIES:  No cyanosis, clubbing, or edema  SKIN:  No rash/erythema/ecchymoses/petechiae/wounds/abscess/warm/dry  NEURO:  Alert and oriented x 3, no asterixis, no tremor    LABS:                        8.4    11.02 )-----------( 228      ( 27 May 2022 05:50 )             25.6     Mean Cell Volume: 96.2 fL (05-27-22 @ 05:50)    05-27    134<L>  |  104  |  10  ----------------------------<  84  3.7   |  19<L>  |  0.46<L>    Ca    9.1      27 May 2022 05:50  Phos  3.6     05-27  Mg     1.60     05-27    TPro  6.1  /  Alb  3.4  /  TBili  8.0<H>  /  DBili  x   /  AST  19  /  ALT  19  /  AlkPhos  43  05-27    LIVER FUNCTIONS - ( 27 May 2022 05:50 )  Alb: 3.4 g/dL / Pro: 6.1 g/dL / ALK PHOS: 43 U/L / ALT: 19 U/L / AST: 19 U/L / GGT: x           PT/INR - ( 27 May 2022 05:50 )   PT: 12.4 sec;   INR: 1.07 ratio         PTT - ( 27 May 2022 05:50 )  PTT:30.0 sec    Amylase Serum--      Lipase serum22       Ammonia--        Imaging:

## 2022-05-27 NOTE — PROGRESS NOTE ADULT - ASSESSMENT
31  F 19wk PMH chronic hepatitis B presented to [facility] due to jaundice, bilirubin 8.8. Considering family Hx and pt presentation, ddx include Dubin Paul (elevated bilirubin w/ normal AST/ALT and strong family Hx, associated w/ pregnancy), medication induced (nitro), hepatitis. Less likely will be ob related (HELLP, acute fatty liver) considering normal US. Will obtain workup for elevated bili and f/u w/ hepatology and MFM. Meanwhile managing medically to R/O UTI. 31  F 19wk PMH chronic hepatitis B presented to [facility] due to jaundice, bilirubin 8.8. Considering family Hx and pt presentation, ddx include Dubin Paul (elevated bilirubin w/ normal AST/ALT and strong family Hx, associated w/ pregnancy), medication induced (nitro), hepatitis (viral load low). Less likely will be ob related (HELLP, acute fatty liver) considering normal US. Will obtain workup for elevated bili and f/u w/ hepatology and MFM.

## 2022-05-27 NOTE — CHART NOTE - NSCHARTNOTEFT_GEN_A_CORE
Pt had official sonogram with Antepartum Unit    ATU(5/27): herbie breech, posterior, MVP 4.33, EFW 295g (81%), normal anatomy    Per consult note yesterday -     32yo P0 at 18w6d by FTS presenting w/ isolated hyperbilirubinemia of unknown etiology. VSS. Pt asxs. Sig scleral icterus. VSS. DDx broad likely inherited disorder of bilirubin conjugation (strong family history) vs hemolytic disease given relative anemia. Agree with medicine and hepatology consult. Pt would also benefit from genetics consultation. No OB intervention at this time. Will continue to follow. Please reach out with any questions    If any questions arise over weekend please call [phone] or call L+D and ask for fellow on call    Luis PGY3

## 2022-05-27 NOTE — PROGRESS NOTE ADULT - PROBLEM SELECTOR PLAN 2
- Hx of UTI, treated w/ nitro  - however physical exam has signs of abd tenderness at lower  - UA to r/o UTI  - if +ve will treat w/ ?ceftriaxone considering that nitro can cause liver injury - Hx of UTI, treated w/ nitro  - no more tenderness at lower abdomen  - if +ve will treat w/ ?ceftriaxone considering that nitro can cause liver injury

## 2022-05-28 ENCOUNTER — TRANSCRIPTION ENCOUNTER (OUTPATIENT)
Age: 32
End: 2022-05-28

## 2022-05-28 VITALS
TEMPERATURE: 97 F | DIASTOLIC BLOOD PRESSURE: 67 MMHG | SYSTOLIC BLOOD PRESSURE: 98 MMHG | RESPIRATION RATE: 18 BRPM | OXYGEN SATURATION: 99 % | HEART RATE: 76 BPM

## 2022-05-28 LAB
ALBUMIN SERPL ELPH-MCNC: 3.5 G/DL — SIGNIFICANT CHANGE UP (ref 3.3–5)
ALP SERPL-CCNC: 46 U/L — SIGNIFICANT CHANGE UP (ref 40–120)
ALT FLD-CCNC: 17 U/L — SIGNIFICANT CHANGE UP (ref 4–33)
ANION GAP SERPL CALC-SCNC: 11 MMOL/L — SIGNIFICANT CHANGE UP (ref 7–14)
APTT BLD: 30.4 SEC — SIGNIFICANT CHANGE UP (ref 27–36.3)
AST SERPL-CCNC: 21 U/L — SIGNIFICANT CHANGE UP (ref 4–32)
BASOPHILS # BLD AUTO: 0.05 K/UL — SIGNIFICANT CHANGE UP (ref 0–0.2)
BASOPHILS NFR BLD AUTO: 0.4 % — SIGNIFICANT CHANGE UP (ref 0–2)
BILIRUB DIRECT SERPL-MCNC: 7.6 MG/DL — HIGH (ref 0–0.3)
BILIRUB INDIRECT FLD-MCNC: 1 MG/DL — SIGNIFICANT CHANGE UP (ref 0–1)
BILIRUB SERPL-MCNC: 8.6 MG/DL — HIGH (ref 0.2–1.2)
BILIRUB SERPL-MCNC: 8.6 MG/DL — HIGH (ref 0.2–1.2)
BUN SERPL-MCNC: 11 MG/DL — SIGNIFICANT CHANGE UP (ref 7–23)
CALCIUM SERPL-MCNC: 8.8 MG/DL — SIGNIFICANT CHANGE UP (ref 8.4–10.5)
CHLORIDE SERPL-SCNC: 105 MMOL/L — SIGNIFICANT CHANGE UP (ref 98–107)
CO2 SERPL-SCNC: 20 MMOL/L — LOW (ref 22–31)
CREAT SERPL-MCNC: 0.42 MG/DL — LOW (ref 0.5–1.3)
EGFR: 132 ML/MIN/1.73M2 — SIGNIFICANT CHANGE UP
EOSINOPHIL # BLD AUTO: 0.34 K/UL — SIGNIFICANT CHANGE UP (ref 0–0.5)
EOSINOPHIL NFR BLD AUTO: 3 % — SIGNIFICANT CHANGE UP (ref 0–6)
GLUCOSE SERPL-MCNC: 78 MG/DL — SIGNIFICANT CHANGE UP (ref 70–99)
HCT VFR BLD CALC: 27.6 % — LOW (ref 34.5–45)
HGB BLD-MCNC: 9.3 G/DL — LOW (ref 11.5–15.5)
IANC: 7.53 K/UL — HIGH (ref 1.8–7.4)
IMM GRANULOCYTES NFR BLD AUTO: 1 % — SIGNIFICANT CHANGE UP (ref 0–1.5)
INR BLD: 1.09 RATIO — SIGNIFICANT CHANGE UP (ref 0.88–1.16)
LYMPHOCYTES # BLD AUTO: 2.62 K/UL — SIGNIFICANT CHANGE UP (ref 1–3.3)
LYMPHOCYTES # BLD AUTO: 23.4 % — SIGNIFICANT CHANGE UP (ref 13–44)
MAGNESIUM SERPL-MCNC: 1.8 MG/DL — SIGNIFICANT CHANGE UP (ref 1.6–2.6)
MCHC RBC-ENTMCNC: 32.2 PG — SIGNIFICANT CHANGE UP (ref 27–34)
MCHC RBC-ENTMCNC: 33.7 GM/DL — SIGNIFICANT CHANGE UP (ref 32–36)
MCV RBC AUTO: 95.5 FL — SIGNIFICANT CHANGE UP (ref 80–100)
MONOCYTES # BLD AUTO: 0.56 K/UL — SIGNIFICANT CHANGE UP (ref 0–0.9)
MONOCYTES NFR BLD AUTO: 5 % — SIGNIFICANT CHANGE UP (ref 2–14)
NEUTROPHILS # BLD AUTO: 7.53 K/UL — HIGH (ref 1.8–7.4)
NEUTROPHILS NFR BLD AUTO: 67.2 % — SIGNIFICANT CHANGE UP (ref 43–77)
NRBC # BLD: 0 /100 WBCS — SIGNIFICANT CHANGE UP
NRBC # FLD: 0 K/UL — SIGNIFICANT CHANGE UP
PHOSPHATE SERPL-MCNC: 2.6 MG/DL — SIGNIFICANT CHANGE UP (ref 2.5–4.5)
PLATELET # BLD AUTO: 209 K/UL — SIGNIFICANT CHANGE UP (ref 150–400)
POTASSIUM SERPL-MCNC: 3.7 MMOL/L — SIGNIFICANT CHANGE UP (ref 3.5–5.3)
POTASSIUM SERPL-SCNC: 3.7 MMOL/L — SIGNIFICANT CHANGE UP (ref 3.5–5.3)
PROT SERPL-MCNC: 6.3 G/DL — SIGNIFICANT CHANGE UP (ref 6–8.3)
PROTHROM AB SERPL-ACNC: 12.6 SEC — SIGNIFICANT CHANGE UP (ref 10.5–13.4)
RBC # BLD: 2.89 M/UL — LOW (ref 3.8–5.2)
RBC # FLD: 16.7 % — HIGH (ref 10.3–14.5)
SODIUM SERPL-SCNC: 136 MMOL/L — SIGNIFICANT CHANGE UP (ref 135–145)
WBC # BLD: 11.21 K/UL — HIGH (ref 3.8–10.5)
WBC # FLD AUTO: 11.21 K/UL — HIGH (ref 3.8–10.5)

## 2022-05-28 PROCEDURE — 99238 HOSP IP/OBS DSCHRG MGMT 30/<: CPT | Mod: GC

## 2022-05-28 PROCEDURE — 99232 SBSQ HOSP IP/OBS MODERATE 35: CPT | Mod: GC

## 2022-05-28 RX ADMIN — Medication 325 MILLIGRAM(S): at 12:07

## 2022-05-28 RX ADMIN — Medication 1 TABLET(S): at 12:07

## 2022-05-28 NOTE — PROGRESS NOTE ADULT - PROBLEM SELECTOR PLAN 3
- stable (baseline 9.7, admission 9.5)  - pt w/ DJ can present w/ HS  - iron deficiency unlikely considering that pt is on iron pills daily  - can be also pregnancy associated anemia  - asymptomatic will monitor
- stable (baseline 9.7, admission 9.5)  - pt w/ DJ can present w/ HS  - iron deficiency unlikely considering that pt is on iron pills daily  - can be also pregnancy associated anemia  - asymptomatic will monitor

## 2022-05-28 NOTE — DISCHARGE NOTE PROVIDER - NSDCMRMEDTOKEN_GEN_ALL_CORE_FT
ergocalciferol 1.25 mg (50,000 intl units) oral tablet: 1 tab(s) orally once a week  ferrous sulfate 325 mg (65 mg elemental iron) oral tablet: 1 tab(s) orally once a day  Prenatal Multivitamins oral tablet: 1 tab(s) orally once a day

## 2022-05-28 NOTE — DISCHARGE NOTE NURSING/CASE MANAGEMENT/SOCIAL WORK - NSDCPEFALRISK_GEN_ALL_CORE
For information on Fall & Injury Prevention, visit: [email or web address] OR  [email or web address] OR  https://www.cdc.gov/steadi/patient.html

## 2022-05-28 NOTE — DISCHARGE NOTE NURSING/CASE MANAGEMENT/SOCIAL WORK - NSDCFUADDAPPT_GEN_ALL_CORE_FT
Hepatology Clinic outpatient follow up number: [phone] ([facility] [place]) or [phone] ([facility] at [address] [place] [address]

## 2022-05-28 NOTE — PROGRESS NOTE ADULT - ATTENDING COMMENTS
Patient pregnant in second trimester and has HBV and jaundice. She states jaundice was noticed during the last 10 years. She has family history of hepatitis and mother  of liver disease and breast cancer.  Now await non-contrast MRI/MRCP to better define liver/spleen anatomy.  If patient has evidence of cirrhosis may need evaluation for varices.  Will also need characterization of hepatitis B to determine if therapy is needed.  Mental status clear.
31W  19w pregnant with PMH significant for but not limited to chronic hepatitis B from birth who is presenting from home with isolated hyperbilirubinemia.   denies ruq pain, sonogram no stricture/stone, TB 8.6, DB 7.6, IB 1.0  # Isolated Conjugated Hyperbilirubinemia - DDx including cirrhosis from chronic hep B, inherited disorder. MRCP today. Continue to monitor LFTs   # Pregnancy - MFM following, appreciate input.  Dispo: DC plan home pending MRI
31W  19w pregnant with PMH significant for but not limited to chronic hepatitis B from birth who is presenting from home with isolated hyperbilirubinemia.     # Isolated Conjugated Hyperbilirubinemia - DDx including cirrhosis from chronic hep B, inherited disorder. Pending MRCP. Continue to monitor LFTs   # Pregnancy - MFM following, appreciate input.

## 2022-05-28 NOTE — DISCHARGE NOTE PROVIDER - CARE PROVIDER_API CALL
sanchez more  [address] [place], [place] [address]  Phone: [phone]  Fax: (   )    -  Established Patient  Follow Up Time: Routine

## 2022-05-28 NOTE — DISCHARGE NOTE PROVIDER - NSDCCPCAREPLAN_GEN_ALL_CORE_FT
PRINCIPAL DISCHARGE DIAGNOSIS  Diagnosis: Elevated bilirubin  Assessment and Plan of Treatment: Your bilirubin is elevated, and we are trying to work up the reason why you have elevated bilirubin. It can be your chronic hepatitis B, it can be caused by medications, or it can be caused by genetic conditions. Your US of liver was benign and your MRI/MRCP showed _______. You should follow up outpatient with your M doctors and your hepatologist to check bilirubin levels.       PRINCIPAL DISCHARGE DIAGNOSIS  Diagnosis: Elevated bilirubin  Assessment and Plan of Treatment: Your bilirubin is elevated, and we are trying to work up the reason why you have elevated bilirubin. It can be your chronic hepatitis B, it can be caused by medications, or it can be caused by genetic conditions. Your US of liver was benign. You left before you could have your MRI and MRCP. You should follow up outpatient with your [facility] doctors and your hepatologist to check bilirubin levels. Please return to the hospital if you have abdominal pain or worsening jaundice.

## 2022-05-28 NOTE — PROGRESS NOTE ADULT - SUBJECTIVE AND OBJECTIVE BOX
PROGRESS NOTE:     Patient is a 31y old  Female who presents with a chief complaint of jaundice (27 May 2022 09:55)      SUBJECTIVE / OVERNIGHT EVENTS:    ADDITIONAL REVIEW OF SYSTEMS:    MEDICATIONS  (STANDING):  ferrous    sulfate 325 milliGRAM(s) Oral daily  influenza   Vaccine 0.5 milliLiter(s) IntraMuscular once  prenatal multivitamin 1 Tablet(s) Oral daily    MEDICATIONS  (PRN):      CAPILLARY BLOOD GLUCOSE        I&O's Summary      PHYSICAL EXAM:  Vital Signs Last 24 Hrs  T(C): 36.3 (28 May 2022 05:53), Max: 36.9 (27 May 2022 12:30)  T(F): 97.4 (28 May 2022 05:53), Max: 98.4 (27 May 2022 12:30)  HR: 76 (28 May 2022 05:53) (76 - 92)  BP: 98/67 (28 May 2022 05:53) (98/57 - 108/61)  BP(mean): --  RR: 18 (28 May 2022 05:53) (18 - 18)  SpO2: 99% (28 May 2022 05:53) (98% - 99%)    CONSTITUTIONAL: NAD, well-developed  HEENT: normal conjunctiva, PEERLA  RESPIRATORY: Normal respiratory effort; lungs are clear to auscultation bilaterally  CARDIOVASCULAR: Regular rate and rhythm, normal S1 and S2, no murmur/rub/gallop;   ABDOMEN: No tenderness to palpation at all four quadrants, +BS  MUSCULOSKELETAL no clubbing or cyanosis of digits; no joint swelling or tenderness to palpation  EXTREMITIES No lower extremity edema; Peripheral pulses are 2+ bilaterally  SKIN: well-perfused, no dry skin    LABS:                        9.3    11.21 )-----------( 209      ( 28 May 2022 06:00 )             27.6     05-27    134<L>  |  104  |  10  ----------------------------<  84  3.7   |  19<L>  |  0.46<L>    Ca    9.1      27 May 2022 05:50  Phos  3.6     05-27  Mg     1.60     05-27    TPro  6.1  /  Alb  3.4  /  TBili  8.0<H>  /  DBili  x   /  AST  19  /  ALT  19  /  AlkPhos  43  05-27    PT/INR - ( 28 May 2022 06:00 )   PT: 12.6 sec;   INR: 1.09 ratio         PTT - ( 28 May 2022 06:00 )  PTT:30.4 sec            RADIOLOGY & ADDITIONAL TESTS:  Results Reviewed:   Imaging Personally Reviewed:  Electrocardiogram Personally Reviewed:    COORDINATION OF CARE:  Care Discussed with Consultants/Other Providers [Y/N]:  Prior or Outpatient Records Reviewed [Y/N]:   PROGRESS NOTE:     Patient is a 31y old  Female who presents with a chief complaint of jaundice (27 May 2022 09:55)      SUBJECTIVE / OVERNIGHT EVENTS: Pt frustrated that MRI was not done yesterday. Called tech and reading room to expedite. Prepare for dc tmr pending MCRP, read, and hepatology recs. Also urine color fluctuates from yellow to dark.    ADDITIONAL REVIEW OF SYSTEMS: -ve    MEDICATIONS  (STANDING):  ferrous    sulfate 325 milliGRAM(s) Oral daily  influenza   Vaccine 0.5 milliLiter(s) IntraMuscular once  prenatal multivitamin 1 Tablet(s) Oral daily    MEDICATIONS  (PRN):      CAPILLARY BLOOD GLUCOSE        I&O's Summary      PHYSICAL EXAM:  Vital Signs Last 24 Hrs  T(C): 36.3 (28 May 2022 05:53), Max: 36.9 (27 May 2022 12:30)  T(F): 97.4 (28 May 2022 05:53), Max: 98.4 (27 May 2022 12:30)  HR: 76 (28 May 2022 05:53) (76 - 92)  BP: 98/67 (28 May 2022 05:53) (98/57 - 108/61)  BP(mean): --  RR: 18 (28 May 2022 05:53) (18 - 18)  SpO2: 99% (28 May 2022 05:53) (98% - 99%)    CONSTITUTIONAL: NAD, well-developed  HEENT: scleritic icterus  RESPIRATORY: Normal respiratory effort; lungs are clear to auscultation bilaterally  CARDIOVASCULAR: Regular rate and rhythm, normal S1 and S2, no murmur/rub/gallop;   ABDOMEN: No tenderness to palpation at all four quadrants, +BS  MUSCULOSKELETAL no clubbing or cyanosis of digits; no joint swelling or tenderness to palpation  EXTREMITIES No lower extremity edema; Peripheral pulses are 2+ bilaterally  SKIN: well-perfused, no dry skin    LABS:                        9.3    11.21 )-----------( 209      ( 28 May 2022 06:00 )             27.6     05-27    134<L>  |  104  |  10  ----------------------------<  84  3.7   |  19<L>  |  0.46<L>    Ca    9.1      27 May 2022 05:50  Phos  3.6     05-27  Mg     1.60     05-27    TPro  6.1  /  Alb  3.4  /  TBili  8.0<H>  /  DBili  x   /  AST  19  /  ALT  19  /  AlkPhos  43  05-27    PT/INR - ( 28 May 2022 06:00 )   PT: 12.6 sec;   INR: 1.09 ratio         PTT - ( 28 May 2022 06:00 )  PTT:30.4 sec            RADIOLOGY & ADDITIONAL TESTS:  Results Reviewed:   Imaging Personally Reviewed:  Electrocardiogram Personally Reviewed:    COORDINATION OF CARE:  Care Discussed with Consultants/Other Providers [Y/N]:  Prior or Outpatient Records Reviewed [Y/N]:

## 2022-05-28 NOTE — DISCHARGE NOTE NURSING/CASE MANAGEMENT/SOCIAL WORK - PATIENT PORTAL LINK FT
You can access the FollowMyHealth Patient Portal offered by [facility] by registering at the following website: [email or web address]. By joining [employer]’s FollowMyHealth portal, you will also be able to view your health information using other applications (apps) compatible with our system.

## 2022-05-28 NOTE — DISCHARGE NOTE PROVIDER - PROVIDER TOKENS
FREE:[LAST:[more],FIRST:[sanchez],PHONE:[phone]],FAX:[(   )    -],ADDRESS:[[address] [place] [address]],FOLLOWUP:[Routine],ESTABLISHEDPATIENT:[T]]

## 2022-05-28 NOTE — DISCHARGE NOTE PROVIDER - HOSPITAL COURSE
31  F 19wk PMH chronic hepatitis B presented to [facility] due to jaundice, bilirubin 8.8. Pt reported that she was born w/ jaundice, and so is her mother who is now  due to ?cancer and sister. She underwent workup in 2012 for jaundice, underwent an endoscopy and was told that results were normal. She reported that her skin is usually not too itchy and the current condition is at her baseline. She noticed that she is getting more jaundiced as she advances through pregnancy. She was given nitrofurantion 2-3 wks ago due to her +UA outpt (record shows 97680-83394 cfu/ml E. Faecalis sensitive to nitro). She stopped taking the meds 1wk ago. At her baseline her urine ranges from light yellow to dark w/ specks of blood. She has a chronic Hx of hepatitis B that is untreated (outpt shows HepB surface Ag +ve and surface Ab -ve). Reports a normal BM pattern.     Pt was seen by OBGYN and hepatology at the ED, w/ low suspicion of HELLP or acute fatty liver. Admitted for further workup of elevated bili. Stable anemia at 9.5, elevated bili w/ normal AST/ALT. US abd benign. VSS.    Her US here was benign, and her MRI/MRCP showed ______. Pt was stable to be dc'd on _______ w/ outpt hepatology f/u. 31  F 19wk PMH chronic hepatitis B presented to [facility] due to jaundice, bilirubin 8.8. Pt reported that she was born w/ jaundice, and so is her mother who is now  due to ?cancer and sister. She underwent workup in 2012 for jaundice, underwent an endoscopy and was told that results were normal. She reported that her skin is usually not too itchy and the current condition is at her baseline. She noticed that she is getting more jaundiced as she advances through pregnancy. She was given nitrofurantion 2-3 wks ago due to her +UA outpt (record shows 00241-69566 cfu/ml E. Faecalis sensitive to nitro). She stopped taking the meds 1wk ago. At her baseline her urine ranges from light yellow to dark w/ specks of blood. She has a chronic Hx of hepatitis B that is untreated (outpt shows HepB surface Ag +ve and surface Ab -ve). Reports a normal BM pattern.     Pt was seen by OBGYN and hepatology at the ED, w/ low suspicion of HELLP or acute fatty liver. Admitted for further workup of elevated bili. Stable anemia at 9.5, elevated bili w/ normal AST/ALT. US abd benign. VSS.    Her US here was benign. Patient was scheduled to have an MRI and MRCP, but decided to sign out AMA before the MRI/MRCP was completed. Patient was advised to return to the hospital if she has worsening jaundice and to follow up closely with hepatology for further management. Patient understands the risks of leaving AMA.

## 2022-05-28 NOTE — DISCHARGE NOTE PROVIDER - NSDCFUSCHEDAPPT_GEN_ALL_CORE_FT
[facility]  AN[facility] [address]   Scheduled Appointment: 05/31/2022

## 2022-05-28 NOTE — DISCHARGE NOTE PROVIDER - NSDCFUADDAPPT_GEN_ALL_CORE_FT
Hepatology Clinic outpatient follow up number: [phone] ([facility] in [place]) or [phone] ([facility] at [address] [place] [address]

## 2022-05-28 NOTE — DISCHARGE NOTE PROVIDER - NSDCCPTREATMENT_GEN_ALL_CORE_FT
PRINCIPAL PROCEDURE  Procedure: MR MRCP  Findings and Treatment:       SECONDARY PROCEDURE  Procedure: Complete abdominal ultrasound  Findings and Treatment: FINDINGS:  Liver: Within normal limits.  Bile ducts: Normal caliber. Common bile duct measures 2 mm.  Gallbladder: Contracted. Within normal limits.  Pancreas: Visualized portions are within normal limits.  Right kidney: 11.4 cm. No hydronephrosis.  Ascites: None.  IVC: Visualized portions are within normal limits.  IMPRESSION:  Normal right upper quadrant abdominal ultrasound.

## 2022-05-28 NOTE — PROGRESS NOTE ADULT - PROBLEM SELECTOR PLAN 2
- Hx of UTI, treated w/ nitro  - no more tenderness at lower abdomen  - if +ve will treat w/ ?ceftriaxone considering that nitro can cause liver injury

## 2022-05-28 NOTE — PROGRESS NOTE ADULT - ASSESSMENT
31  F 19wk PMH chronic hepatitis B presented to [facility] due to jaundice, bilirubin 8.8. Considering family Hx and pt presentation, ddx include Dubin Paul (elevated bilirubin w/ normal AST/ALT and strong family Hx, associated w/ pregnancy), medication induced (nitro), hepatitis (viral load low). Less likely will be ob related (HELLP, acute fatty liver) considering normal US. Will obtain workup for elevated bili and f/u w/ hepatology and MFM.  31  F 19wk PMH chronic hepatitis B presented to [facility] due to jaundice, bilirubin 8.8. Considering family Hx and pt presentation, ddx include Dubin Paul (elevated bilirubin w/ normal AST/ALT and strong family Hx, associated w/ pregnancy), medication induced (nitro), hepatitis (viral load low). Less likely will be ob related (HELLP, acute fatty liver) considering normal US. Await MRCP before dc planning.

## 2022-05-28 NOTE — PROGRESS NOTE ADULT - PROBLEM SELECTOR PLAN 1
- pt has family hx of jaundice, personal Hx of chronic hepatitis  - at admission, bili elevated (direct >> indirect), AST/ALT normal  - suspecting Dubin Johnson vs drug induced vs hepatitis  - will send urine porphyrin fractionated  - has outpt record that pt has +ve hepatitis B surface Ag and -ve surface Ab  - will f/u hepatology recs for blood tests: chronic hep B w/ low viral load  - US abdomen benign - pt has family hx of jaundice, personal Hx of chronic hepatitis  - at admission, bili elevated (direct >> indirect), AST/ALT normal  - suspecting Dubin Paul vs drug induced vs hepatitis  - will send urine porphyrin fractionated for DJS  - has outpt record that pt has +ve hepatitis B surface Ag and -ve surface Ab  - will f/u hepatology recs for blood tests: chronic hep B w/ low viral load  - US abdomen benign, will await MRCP

## 2022-05-31 ENCOUNTER — APPOINTMENT (OUTPATIENT)
Dept: ANTEPARTUM | Facility: CLINIC | Age: 32
End: 2022-05-31
Payer: MEDICAID

## 2022-05-31 ENCOUNTER — ASOB RESULT (OUTPATIENT)
Age: 32
End: 2022-05-31

## 2022-05-31 PROCEDURE — 76811 OB US DETAILED SNGL FETUS: CPT

## 2022-06-01 PROBLEM — Z00.00 ENCOUNTER FOR PREVENTIVE HEALTH EXAMINATION: Status: ACTIVE | Noted: 2022-06-01

## 2022-06-01 LAB
COPROPORPHYRIN I - RANDOM URINE: 222 UG/L — HIGH (ref 0–15)
COPROPORPHYRIN III - RANDOM URINE: 18 UG/L — SIGNIFICANT CHANGE UP (ref 0–49)
HEPTA-CP UR-MCNC: 3 UG/L — HIGH (ref 0–2)
HEV AB FLD QL: NEGATIVE — SIGNIFICANT CHANGE UP
HEV IGM SER QL: SIGNIFICANT CHANGE UP
HEXA-CP UR-MCNC: <1 UG/L — SIGNIFICANT CHANGE UP (ref 0–1)
PENTACARBOXYPORPHRIN, RANDOM URINE: 3 UG/L — HIGH (ref 0–2)
UROPORPHYRIN I - RANDOM URINE: 12 UG/L — SIGNIFICANT CHANGE UP (ref 0–20)

## 2022-06-02 LAB
HDV AB SER-ACNC: NEGATIVE — SIGNIFICANT CHANGE UP
HDV AB SER-ACNC: NEGATIVE — SIGNIFICANT CHANGE UP
HDV IGM SER QL IA: SIGNIFICANT CHANGE UP
HDV IGM SER QL IA: SIGNIFICANT CHANGE UP

## 2022-08-24 ENCOUNTER — APPOINTMENT (OUTPATIENT)
Dept: MATERNAL FETAL MEDICINE | Facility: CLINIC | Age: 32
End: 2022-08-24

## 2022-08-24 ENCOUNTER — APPOINTMENT (OUTPATIENT)
Dept: ANTEPARTUM | Facility: CLINIC | Age: 32
End: 2022-08-24

## 2022-08-29 ENCOUNTER — APPOINTMENT (OUTPATIENT)
Dept: ANTEPARTUM | Facility: CLINIC | Age: 32
End: 2022-08-29

## 2022-09-06 ENCOUNTER — APPOINTMENT (OUTPATIENT)
Dept: ANTEPARTUM | Facility: CLINIC | Age: 32
End: 2022-09-06

## 2022-09-06 ENCOUNTER — ASOB RESULT (OUTPATIENT)
Age: 32
End: 2022-09-06

## 2022-09-06 PROCEDURE — 76816 OB US FOLLOW-UP PER FETUS: CPT

## 2022-09-06 PROCEDURE — 76818 FETAL BIOPHYS PROFILE W/NST: CPT

## 2022-09-12 ENCOUNTER — APPOINTMENT (OUTPATIENT)
Dept: ANTEPARTUM | Facility: CLINIC | Age: 32
End: 2022-09-12

## 2022-09-12 ENCOUNTER — ASOB RESULT (OUTPATIENT)
Age: 32
End: 2022-09-12

## 2022-09-12 PROCEDURE — 76818 FETAL BIOPHYS PROFILE W/NST: CPT

## 2022-09-16 ENCOUNTER — APPOINTMENT (OUTPATIENT)
Dept: MATERNAL FETAL MEDICINE | Facility: CLINIC | Age: 32
End: 2022-09-16

## 2022-09-16 ENCOUNTER — ASOB RESULT (OUTPATIENT)
Age: 32
End: 2022-09-16

## 2022-09-16 PROCEDURE — 99203 OFFICE O/P NEW LOW 30 MIN: CPT | Mod: TH,95

## 2022-09-19 ENCOUNTER — APPOINTMENT (OUTPATIENT)
Dept: ANTEPARTUM | Facility: CLINIC | Age: 32
End: 2022-09-19

## 2022-09-19 ENCOUNTER — ASOB RESULT (OUTPATIENT)
Age: 32
End: 2022-09-19

## 2022-09-19 PROCEDURE — 76818 FETAL BIOPHYS PROFILE W/NST: CPT

## 2022-09-26 ENCOUNTER — APPOINTMENT (OUTPATIENT)
Dept: ANTEPARTUM | Facility: CLINIC | Age: 32
End: 2022-09-26

## 2022-09-26 ENCOUNTER — ASOB RESULT (OUTPATIENT)
Age: 32
End: 2022-09-26

## 2022-09-26 PROCEDURE — 76818 FETAL BIOPHYS PROFILE W/NST: CPT | Mod: 59

## 2022-09-26 PROCEDURE — 76816 OB US FOLLOW-UP PER FETUS: CPT

## 2022-10-03 ENCOUNTER — ASOB RESULT (OUTPATIENT)
Age: 32
End: 2022-10-03

## 2022-10-03 ENCOUNTER — APPOINTMENT (OUTPATIENT)
Dept: ANTEPARTUM | Facility: CLINIC | Age: 32
End: 2022-10-03

## 2022-10-03 PROCEDURE — 99213 OFFICE O/P EST LOW 20 MIN: CPT | Mod: TH,25

## 2022-10-03 PROCEDURE — 76818 FETAL BIOPHYS PROFILE W/NST: CPT

## 2022-10-04 ENCOUNTER — NON-APPOINTMENT (OUTPATIENT)
Age: 32
End: 2022-10-04

## 2022-10-06 ENCOUNTER — APPOINTMENT (OUTPATIENT)
Dept: ANTEPARTUM | Facility: CLINIC | Age: 32
End: 2022-10-06

## 2022-10-10 ENCOUNTER — APPOINTMENT (OUTPATIENT)
Dept: ANTEPARTUM | Facility: CLINIC | Age: 32
End: 2022-10-10

## 2022-10-11 ENCOUNTER — ASOB RESULT (OUTPATIENT)
Age: 32
End: 2022-10-11

## 2022-10-11 ENCOUNTER — APPOINTMENT (OUTPATIENT)
Dept: ANTEPARTUM | Facility: CLINIC | Age: 32
End: 2022-10-11

## 2022-10-11 PROCEDURE — 76818 FETAL BIOPHYS PROFILE W/NST: CPT

## 2022-10-12 ENCOUNTER — INPATIENT (INPATIENT)
Facility: HOSPITAL | Age: 32
LOS: 4 days | Discharge: HOME | End: 2022-10-17
Attending: STUDENT IN AN ORGANIZED HEALTH CARE EDUCATION/TRAINING PROGRAM | Admitting: STUDENT IN AN ORGANIZED HEALTH CARE EDUCATION/TRAINING PROGRAM

## 2022-10-12 VITALS
SYSTOLIC BLOOD PRESSURE: 102 MMHG | HEIGHT: 59 IN | RESPIRATION RATE: 17 BRPM | WEIGHT: 220.46 LBS | TEMPERATURE: 98 F | DIASTOLIC BLOOD PRESSURE: 64 MMHG | HEART RATE: 84 BPM

## 2022-10-12 DIAGNOSIS — K76.9 LIVER DISEASE, UNSPECIFIED: ICD-10-CM

## 2022-10-12 DIAGNOSIS — D57.219 SICKLE-CELL/HB-C DISEASE WITH CRISIS, UNSPECIFIED: ICD-10-CM

## 2022-10-12 LAB
ALBUMIN SERPL ELPH-MCNC: 3.1 G/DL — LOW (ref 3.3–5)
ALP SERPL-CCNC: 187 U/L — HIGH (ref 40–120)
ALT FLD-CCNC: 17 U/L — SIGNIFICANT CHANGE UP (ref 4–33)
ANION GAP SERPL CALC-SCNC: 12 MMOL/L — SIGNIFICANT CHANGE UP (ref 7–14)
APTT BLD: 27.1 SEC — SIGNIFICANT CHANGE UP (ref 27–36.3)
AST SERPL-CCNC: 23 U/L — SIGNIFICANT CHANGE UP (ref 4–32)
BASOPHILS # BLD AUTO: 0.04 K/UL — SIGNIFICANT CHANGE UP (ref 0–0.2)
BASOPHILS NFR BLD AUTO: 0.4 % — SIGNIFICANT CHANGE UP (ref 0–2)
BILIRUB SERPL-MCNC: 10.2 MG/DL — HIGH (ref 0.2–1.2)
BLD GP AB SCN SERPL QL: NEGATIVE — SIGNIFICANT CHANGE UP
BUN SERPL-MCNC: 13 MG/DL — SIGNIFICANT CHANGE UP (ref 7–23)
CALCIUM SERPL-MCNC: 9.1 MG/DL — SIGNIFICANT CHANGE UP (ref 8.4–10.5)
CHLORIDE SERPL-SCNC: 104 MMOL/L — SIGNIFICANT CHANGE UP (ref 98–107)
CO2 SERPL-SCNC: 20 MMOL/L — LOW (ref 22–31)
COVID-19 SPIKE DOMAIN AB INTERP: POSITIVE
COVID-19 SPIKE DOMAIN ANTIBODY RESULT: >250 U/ML — HIGH
CREAT SERPL-MCNC: 0.59 MG/DL — SIGNIFICANT CHANGE UP (ref 0.5–1.3)
EGFR: 121 ML/MIN/1.73M2 — SIGNIFICANT CHANGE UP
EOSINOPHIL # BLD AUTO: 0.21 K/UL — SIGNIFICANT CHANGE UP (ref 0–0.5)
EOSINOPHIL NFR BLD AUTO: 2.2 % — SIGNIFICANT CHANGE UP (ref 0–6)
GLUCOSE SERPL-MCNC: 86 MG/DL — SIGNIFICANT CHANGE UP (ref 70–99)
HCT VFR BLD CALC: 30.2 % — LOW (ref 34.5–45)
HGB BLD-MCNC: 10.3 G/DL — LOW (ref 11.5–15.5)
IANC: 6.99 K/UL — SIGNIFICANT CHANGE UP (ref 1.8–7.4)
IMM GRANULOCYTES NFR BLD AUTO: 2 % — HIGH (ref 0–0.9)
INR BLD: 0.98 RATIO — SIGNIFICANT CHANGE UP (ref 0.88–1.16)
LYMPHOCYTES # BLD AUTO: 1.7 K/UL — SIGNIFICANT CHANGE UP (ref 1–3.3)
LYMPHOCYTES # BLD AUTO: 17.5 % — SIGNIFICANT CHANGE UP (ref 13–44)
MCHC RBC-ENTMCNC: 31.9 PG — SIGNIFICANT CHANGE UP (ref 27–34)
MCHC RBC-ENTMCNC: 34.1 GM/DL — SIGNIFICANT CHANGE UP (ref 32–36)
MCV RBC AUTO: 93.5 FL — SIGNIFICANT CHANGE UP (ref 80–100)
MONOCYTES # BLD AUTO: 0.59 K/UL — SIGNIFICANT CHANGE UP (ref 0–0.9)
MONOCYTES NFR BLD AUTO: 6.1 % — SIGNIFICANT CHANGE UP (ref 2–14)
NEUTROPHILS # BLD AUTO: 6.99 K/UL — SIGNIFICANT CHANGE UP (ref 1.8–7.4)
NEUTROPHILS NFR BLD AUTO: 71.8 % — SIGNIFICANT CHANGE UP (ref 43–77)
NRBC # BLD: 0 /100 WBCS — SIGNIFICANT CHANGE UP (ref 0–0)
NRBC # FLD: 0 K/UL — SIGNIFICANT CHANGE UP (ref 0–0)
PLATELET # BLD AUTO: 188 K/UL — SIGNIFICANT CHANGE UP (ref 150–400)
POTASSIUM SERPL-MCNC: 4.3 MMOL/L — SIGNIFICANT CHANGE UP (ref 3.5–5.3)
POTASSIUM SERPL-SCNC: 4.3 MMOL/L — SIGNIFICANT CHANGE UP (ref 3.5–5.3)
PROT SERPL-MCNC: 5.8 G/DL — LOW (ref 6–8.3)
PROTHROM AB SERPL-ACNC: 11.4 SEC — SIGNIFICANT CHANGE UP (ref 10.5–13.4)
RBC # BLD: 3.23 M/UL — LOW (ref 3.8–5.2)
RBC # FLD: 17 % — HIGH (ref 10.3–14.5)
RH IG SCN BLD-IMP: NEGATIVE — SIGNIFICANT CHANGE UP
RH IG SCN BLD-IMP: NEGATIVE — SIGNIFICANT CHANGE UP
SARS-COV-2 IGG+IGM SERPL QL IA: >250 U/ML — HIGH
SARS-COV-2 IGG+IGM SERPL QL IA: POSITIVE
SARS-COV-2 RNA SPEC QL NAA+PROBE: SIGNIFICANT CHANGE UP
SODIUM SERPL-SCNC: 136 MMOL/L — SIGNIFICANT CHANGE UP (ref 135–145)
WBC # BLD: 9.72 K/UL — SIGNIFICANT CHANGE UP (ref 3.8–10.5)
WBC # FLD AUTO: 9.72 K/UL — SIGNIFICANT CHANGE UP (ref 3.8–10.5)

## 2022-10-12 RX ORDER — AMPICILLIN TRIHYDRATE 250 MG
1 CAPSULE ORAL EVERY 4 HOURS
Refills: 0 | Status: DISCONTINUED | OUTPATIENT
Start: 2022-10-12 | End: 2022-10-14

## 2022-10-12 RX ORDER — AMPICILLIN TRIHYDRATE 250 MG
2 CAPSULE ORAL ONCE
Refills: 0 | Status: COMPLETED | OUTPATIENT
Start: 2022-10-12 | End: 2022-10-12

## 2022-10-12 RX ORDER — INFLUENZA VIRUS VACCINE 15; 15; 15; 15 UG/.5ML; UG/.5ML; UG/.5ML; UG/.5ML
0.5 SUSPENSION INTRAMUSCULAR ONCE
Refills: 0 | Status: DISCONTINUED | OUTPATIENT
Start: 2022-10-12 | End: 2022-10-14

## 2022-10-12 RX ORDER — SODIUM CHLORIDE 9 MG/ML
1000 INJECTION, SOLUTION INTRAVENOUS
Refills: 0 | Status: DISCONTINUED | OUTPATIENT
Start: 2022-10-12 | End: 2022-10-13

## 2022-10-12 RX ORDER — OXYTOCIN 10 UNIT/ML
333.33 VIAL (ML) INJECTION
Qty: 20 | Refills: 0 | Status: DISCONTINUED | OUTPATIENT
Start: 2022-10-12 | End: 2022-10-14

## 2022-10-12 RX ORDER — CITRIC ACID/SODIUM CITRATE 300-500 MG
15 SOLUTION, ORAL ORAL EVERY 6 HOURS
Refills: 0 | Status: DISCONTINUED | OUTPATIENT
Start: 2022-10-12 | End: 2022-10-13

## 2022-10-12 RX ORDER — AMPICILLIN TRIHYDRATE 250 MG
CAPSULE ORAL
Refills: 0 | Status: DISCONTINUED | OUTPATIENT
Start: 2022-10-12 | End: 2022-10-14

## 2022-10-12 RX ORDER — CHLORHEXIDINE GLUCONATE 213 G/1000ML
1 SOLUTION TOPICAL ONCE
Refills: 0 | Status: DISCONTINUED | OUTPATIENT
Start: 2022-10-12 | End: 2022-10-13

## 2022-10-12 RX ADMIN — Medication 108 GRAM(S): at 20:25

## 2022-10-12 RX ADMIN — Medication 216 GRAM(S): at 16:20

## 2022-10-12 RX ADMIN — SODIUM CHLORIDE 125 MILLILITER(S): 9 INJECTION, SOLUTION INTRAVENOUS at 16:08

## 2022-10-12 RX ADMIN — SODIUM CHLORIDE 125 MILLILITER(S): 9 INJECTION, SOLUTION INTRAVENOUS at 19:35

## 2022-10-12 NOTE — OB PROVIDER H&P - HISTORY OF PRESENT ILLNESS
Patient is a  @ 38+4 GBS+ EFW presents for a scheduled IOL for elevated bilirubin & chronic Hepatitis B. Patient denies ctx, lof, vb & endorses +FM  PNC otherwise uncomplicated  accepts blood transfusion    Patient with PMHx of Hepatitis B/A since childhood per patient. Denies treatment prior to pregnancy. States her mother () and some siblings have Hepatitis B. Patient was admitted to hospital in May at 19 weeks for worsening jaundice and elevated bilirubin of 9.7. Patient was worked up by Hepatology inpatient but left prior to MRI being completed. Denies follow up with Hepatologist since then.   - Hepatologist/GI doctor Dr. Vidhi Godfrey with [facility] & Dr Eddi Herrera  - patient states jaundice has worsened this pregnancy but that she always has some slight icterus prior to pregnancy     GynHx: denies  SHx: denies  NKDA  Meds: PNV, Ursodiol   Social: denies    VS  T(C): --  HR: 84 (10-12-22 @ 15:13)  BP: 102/64 (10-12-22 @ 15:13)  RR: 17 (10-12-22 @ 14:50)  SpO2: --    appears comfortable  scleral icterus noted   abd soft gravid  /mod toribio/+accels/no decels  [place] no ctx noted  Sono - vtx confirmed   0/50/-3

## 2022-10-12 NOTE — OB PROVIDER H&P - ASSESSMENT
P0 @ 38+4 presenting for IOL for elevated bilirubin +HepB/A as per MFM recommendation  admit to L&D  routine labs  CMP   cytotec for IOL  amp for +GBS    will dw Dr Barbara garcía pa

## 2022-10-12 NOTE — OB RN PATIENT PROFILE - NS_MEANSOFARRIVAL_OBGYN_ALL_OB

## 2022-10-12 NOTE — OB RN PATIENT PROFILE - ALERT: PERTINENT HISTORY
alert
1st Trimester Sonogram/20 Week Level II Sonogram/BioPhysical Profile(s)/Fetal Non-Stress Test (NST)

## 2022-10-12 NOTE — OB RN PATIENT PROFILE - NSMATERNALFETALCONCERNS_OBGYN_ALL_OB_FT
Maternal/Fetal Alert  s/p MFM consult secondary to maternal chronic Hep B , Jaundice. MFM recommneded maternal MRI to exclude cirrhosis but patient was unable to get MRI secondary to insurance issues . Currently taking Ursodiol.  FETAL ALERT multiple loops of dilated bowel on sonogram ( 10/3/2022) Normal target sign and FV.   alert NICU for  observation and follow up   Kandi Darnell RN 10/4/2022

## 2022-10-13 ENCOUNTER — TRANSCRIPTION ENCOUNTER (OUTPATIENT)
Age: 32
End: 2022-10-13

## 2022-10-13 ENCOUNTER — RESULT REVIEW (OUTPATIENT)
Age: 32
End: 2022-10-13

## 2022-10-13 LAB
ALBUMIN SERPL ELPH-MCNC: 2.8 G/DL — LOW (ref 3.3–5)
ALBUMIN SERPL ELPH-MCNC: 3.2 G/DL — LOW (ref 3.3–5)
ALP SERPL-CCNC: 174 U/L — HIGH (ref 40–120)
ALP SERPL-CCNC: 182 U/L — HIGH (ref 40–120)
ALT FLD-CCNC: 13 U/L — SIGNIFICANT CHANGE UP (ref 4–33)
ALT FLD-CCNC: 14 U/L — SIGNIFICANT CHANGE UP (ref 4–33)
ANION GAP SERPL CALC-SCNC: 13 MMOL/L — SIGNIFICANT CHANGE UP (ref 7–14)
ANION GAP SERPL CALC-SCNC: 13 MMOL/L — SIGNIFICANT CHANGE UP (ref 7–14)
APPEARANCE UR: CLEAR — SIGNIFICANT CHANGE UP
APTT BLD: 27.9 SEC — SIGNIFICANT CHANGE UP (ref 27–36.3)
APTT BLD: 28.7 SEC — SIGNIFICANT CHANGE UP (ref 27–36.3)
AST SERPL-CCNC: 20 U/L — SIGNIFICANT CHANGE UP (ref 4–32)
AST SERPL-CCNC: 23 U/L — SIGNIFICANT CHANGE UP (ref 4–32)
BACTERIA # UR AUTO: NEGATIVE — SIGNIFICANT CHANGE UP
BASOPHILS # BLD AUTO: 0.05 K/UL — SIGNIFICANT CHANGE UP (ref 0–0.2)
BASOPHILS # BLD AUTO: 0.08 K/UL — SIGNIFICANT CHANGE UP (ref 0–0.2)
BASOPHILS NFR BLD AUTO: 0.3 % — SIGNIFICANT CHANGE UP (ref 0–2)
BASOPHILS NFR BLD AUTO: 0.5 % — SIGNIFICANT CHANGE UP (ref 0–2)
BILIRUB SERPL-MCNC: 10.7 MG/DL — HIGH (ref 0.2–1.2)
BILIRUB SERPL-MCNC: 9.8 MG/DL — HIGH (ref 0.2–1.2)
BILIRUB UR-MCNC: ABNORMAL
BUN SERPL-MCNC: 10 MG/DL — SIGNIFICANT CHANGE UP (ref 7–23)
BUN SERPL-MCNC: 12 MG/DL — SIGNIFICANT CHANGE UP (ref 7–23)
CALCIUM SERPL-MCNC: 8.5 MG/DL — SIGNIFICANT CHANGE UP (ref 8.4–10.5)
CALCIUM SERPL-MCNC: 9 MG/DL — SIGNIFICANT CHANGE UP (ref 8.4–10.5)
CHLORIDE SERPL-SCNC: 104 MMOL/L — SIGNIFICANT CHANGE UP (ref 98–107)
CHLORIDE SERPL-SCNC: 106 MMOL/L — SIGNIFICANT CHANGE UP (ref 98–107)
CO2 SERPL-SCNC: 18 MMOL/L — LOW (ref 22–31)
CO2 SERPL-SCNC: 19 MMOL/L — LOW (ref 22–31)
COLOR SPEC: ABNORMAL
CREAT ?TM UR-MCNC: 51 MG/DL — SIGNIFICANT CHANGE UP
CREAT SERPL-MCNC: 0.5 MG/DL — SIGNIFICANT CHANGE UP (ref 0.5–1.3)
CREAT SERPL-MCNC: 0.56 MG/DL — SIGNIFICANT CHANGE UP (ref 0.5–1.3)
DIFF PNL FLD: ABNORMAL
EGFR: 123 ML/MIN/1.73M2 — SIGNIFICANT CHANGE UP
EGFR: 126 ML/MIN/1.73M2 — SIGNIFICANT CHANGE UP
EOSINOPHIL # BLD AUTO: 0.03 K/UL — SIGNIFICANT CHANGE UP (ref 0–0.5)
EOSINOPHIL # BLD AUTO: 0.14 K/UL — SIGNIFICANT CHANGE UP (ref 0–0.5)
EOSINOPHIL NFR BLD AUTO: 0.2 % — SIGNIFICANT CHANGE UP (ref 0–6)
EOSINOPHIL NFR BLD AUTO: 1 % — SIGNIFICANT CHANGE UP (ref 0–6)
EPI CELLS # UR: 1 /HPF — SIGNIFICANT CHANGE UP (ref 0–5)
FIBRINOGEN PPP-MCNC: 763 MG/DL — HIGH (ref 330–520)
FIBRINOGEN PPP-MCNC: 779 MG/DL — HIGH (ref 330–520)
GLUCOSE SERPL-MCNC: 82 MG/DL — SIGNIFICANT CHANGE UP (ref 70–99)
GLUCOSE SERPL-MCNC: 86 MG/DL — SIGNIFICANT CHANGE UP (ref 70–99)
GLUCOSE UR QL: NEGATIVE — SIGNIFICANT CHANGE UP
HCT VFR BLD CALC: 32.5 % — LOW (ref 34.5–45)
HCT VFR BLD CALC: 33.4 % — LOW (ref 34.5–45)
HGB BLD-MCNC: 11.1 G/DL — LOW (ref 11.5–15.5)
HGB BLD-MCNC: 11.1 G/DL — LOW (ref 11.5–15.5)
IANC: 11.22 K/UL — HIGH (ref 1.8–7.4)
IANC: 15.07 K/UL — HIGH (ref 1.8–7.4)
IMM GRANULOCYTES NFR BLD AUTO: 0.8 % — SIGNIFICANT CHANGE UP (ref 0–0.9)
IMM GRANULOCYTES NFR BLD AUTO: 0.9 % — SIGNIFICANT CHANGE UP (ref 0–0.9)
INR BLD: 0.97 RATIO — SIGNIFICANT CHANGE UP (ref 0.88–1.16)
INR BLD: 0.99 RATIO — SIGNIFICANT CHANGE UP (ref 0.88–1.16)
KETONES UR-MCNC: NEGATIVE — SIGNIFICANT CHANGE UP
LDH SERPL L TO P-CCNC: 186 U/L — SIGNIFICANT CHANGE UP (ref 135–225)
LDH SERPL L TO P-CCNC: 187 U/L — SIGNIFICANT CHANGE UP (ref 135–225)
LEUKOCYTE ESTERASE UR-ACNC: NEGATIVE — SIGNIFICANT CHANGE UP
LYMPHOCYTES # BLD AUTO: 1.11 K/UL — SIGNIFICANT CHANGE UP (ref 1–3.3)
LYMPHOCYTES # BLD AUTO: 15.1 % — SIGNIFICANT CHANGE UP (ref 13–44)
LYMPHOCYTES # BLD AUTO: 2.21 K/UL — SIGNIFICANT CHANGE UP (ref 1–3.3)
LYMPHOCYTES # BLD AUTO: 6.4 % — LOW (ref 13–44)
MCHC RBC-ENTMCNC: 31.9 PG — SIGNIFICANT CHANGE UP (ref 27–34)
MCHC RBC-ENTMCNC: 32 PG — SIGNIFICANT CHANGE UP (ref 27–34)
MCHC RBC-ENTMCNC: 33.2 GM/DL — SIGNIFICANT CHANGE UP (ref 32–36)
MCHC RBC-ENTMCNC: 34.2 GM/DL — SIGNIFICANT CHANGE UP (ref 32–36)
MCV RBC AUTO: 93.4 FL — SIGNIFICANT CHANGE UP (ref 80–100)
MCV RBC AUTO: 96.3 FL — SIGNIFICANT CHANGE UP (ref 80–100)
MONOCYTES # BLD AUTO: 0.86 K/UL — SIGNIFICANT CHANGE UP (ref 0–0.9)
MONOCYTES # BLD AUTO: 0.88 K/UL — SIGNIFICANT CHANGE UP (ref 0–0.9)
MONOCYTES NFR BLD AUTO: 5.1 % — SIGNIFICANT CHANGE UP (ref 2–14)
MONOCYTES NFR BLD AUTO: 5.9 % — SIGNIFICANT CHANGE UP (ref 2–14)
NEUTROPHILS # BLD AUTO: 11.22 K/UL — HIGH (ref 1.8–7.4)
NEUTROPHILS # BLD AUTO: 15.07 K/UL — HIGH (ref 1.8–7.4)
NEUTROPHILS NFR BLD AUTO: 76.7 % — SIGNIFICANT CHANGE UP (ref 43–77)
NEUTROPHILS NFR BLD AUTO: 87.1 % — HIGH (ref 43–77)
NITRITE UR-MCNC: NEGATIVE — SIGNIFICANT CHANGE UP
NRBC # BLD: 0 /100 WBCS — SIGNIFICANT CHANGE UP (ref 0–0)
NRBC # BLD: 0 /100 WBCS — SIGNIFICANT CHANGE UP (ref 0–0)
NRBC # FLD: 0 K/UL — SIGNIFICANT CHANGE UP (ref 0–0)
NRBC # FLD: 0 K/UL — SIGNIFICANT CHANGE UP (ref 0–0)
PH UR: 7.5 — SIGNIFICANT CHANGE UP (ref 5–8)
PLATELET # BLD AUTO: 172 K/UL — SIGNIFICANT CHANGE UP (ref 150–400)
PLATELET # BLD AUTO: 177 K/UL — SIGNIFICANT CHANGE UP (ref 150–400)
POTASSIUM SERPL-MCNC: 3.8 MMOL/L — SIGNIFICANT CHANGE UP (ref 3.5–5.3)
POTASSIUM SERPL-MCNC: 4.1 MMOL/L — SIGNIFICANT CHANGE UP (ref 3.5–5.3)
POTASSIUM SERPL-SCNC: 3.8 MMOL/L — SIGNIFICANT CHANGE UP (ref 3.5–5.3)
POTASSIUM SERPL-SCNC: 4.1 MMOL/L — SIGNIFICANT CHANGE UP (ref 3.5–5.3)
PROT ?TM UR-MCNC: 33 MG/DL — SIGNIFICANT CHANGE UP
PROT ?TM UR-MCNC: 33 MG/DL — SIGNIFICANT CHANGE UP
PROT SERPL-MCNC: 5.8 G/DL — LOW (ref 6–8.3)
PROT SERPL-MCNC: 6.3 G/DL — SIGNIFICANT CHANGE UP (ref 6–8.3)
PROT UR-MCNC: ABNORMAL
PROT/CREAT UR-RTO: 0.6 RATIO — HIGH (ref 0–0.2)
PROTHROM AB SERPL-ACNC: 11.3 SEC — SIGNIFICANT CHANGE UP (ref 10.5–13.4)
PROTHROM AB SERPL-ACNC: 11.5 SEC — SIGNIFICANT CHANGE UP (ref 10.5–13.4)
RBC # BLD: 3.47 M/UL — LOW (ref 3.8–5.2)
RBC # BLD: 3.48 M/UL — LOW (ref 3.8–5.2)
RBC # FLD: 17.2 % — HIGH (ref 10.3–14.5)
RBC # FLD: 17.2 % — HIGH (ref 10.3–14.5)
RBC CASTS # UR COMP ASSIST: 3 /HPF — SIGNIFICANT CHANGE UP (ref 0–4)
SODIUM SERPL-SCNC: 136 MMOL/L — SIGNIFICANT CHANGE UP (ref 135–145)
SODIUM SERPL-SCNC: 137 MMOL/L — SIGNIFICANT CHANGE UP (ref 135–145)
SP GR SPEC: 1.02 — SIGNIFICANT CHANGE UP (ref 1.01–1.05)
T PALLIDUM AB TITR SER: NEGATIVE — SIGNIFICANT CHANGE UP
URATE SERPL-MCNC: 3.5 MG/DL — SIGNIFICANT CHANGE UP (ref 2.5–7)
URATE SERPL-MCNC: 3.6 MG/DL — SIGNIFICANT CHANGE UP (ref 2.5–7)
UROBILINOGEN FLD QL: ABNORMAL
WBC # BLD: 14.62 K/UL — HIGH (ref 3.8–10.5)
WBC # BLD: 17.3 K/UL — HIGH (ref 3.8–10.5)
WBC # FLD AUTO: 14.62 K/UL — HIGH (ref 3.8–10.5)
WBC # FLD AUTO: 17.3 K/UL — HIGH (ref 3.8–10.5)
WBC UR QL: 1 /HPF — SIGNIFICANT CHANGE UP (ref 0–5)

## 2022-10-13 PROCEDURE — 99222 1ST HOSP IP/OBS MODERATE 55: CPT | Mod: GC

## 2022-10-13 RX ORDER — SODIUM CHLORIDE 9 MG/ML
1000 INJECTION INTRAMUSCULAR; INTRAVENOUS; SUBCUTANEOUS
Refills: 0 | Status: DISCONTINUED | OUTPATIENT
Start: 2022-10-13 | End: 2022-10-14

## 2022-10-13 RX ORDER — OXYTOCIN 10 UNIT/ML
2 VIAL (ML) INJECTION
Qty: 30 | Refills: 0 | Status: DISCONTINUED | OUTPATIENT
Start: 2022-10-13 | End: 2022-10-14

## 2022-10-13 RX ORDER — SODIUM CHLORIDE 9 MG/ML
500 INJECTION, SOLUTION INTRAVENOUS ONCE
Refills: 0 | Status: COMPLETED | OUTPATIENT
Start: 2022-10-13 | End: 2022-10-13

## 2022-10-13 RX ORDER — DIPHENHYDRAMINE HCL 50 MG
25 CAPSULE ORAL ONCE
Refills: 0 | Status: COMPLETED | OUTPATIENT
Start: 2022-10-13 | End: 2022-10-13

## 2022-10-13 RX ORDER — SODIUM CHLORIDE 9 MG/ML
300 INJECTION INTRAMUSCULAR; INTRAVENOUS; SUBCUTANEOUS ONCE
Refills: 0 | Status: DISCONTINUED | OUTPATIENT
Start: 2022-10-13 | End: 2022-10-14

## 2022-10-13 RX ORDER — SODIUM CHLORIDE 9 MG/ML
1000 INJECTION, SOLUTION INTRAVENOUS
Refills: 0 | Status: DISCONTINUED | OUTPATIENT
Start: 2022-10-13 | End: 2022-10-14

## 2022-10-13 RX ORDER — SODIUM CHLORIDE 9 MG/ML
1000 INJECTION, SOLUTION INTRAVENOUS ONCE
Refills: 0 | Status: DISCONTINUED | OUTPATIENT
Start: 2022-10-13 | End: 2022-10-14

## 2022-10-13 RX ADMIN — Medication 108 GRAM(S): at 04:55

## 2022-10-13 RX ADMIN — Medication 108 GRAM(S): at 00:25

## 2022-10-13 RX ADMIN — Medication 25 MILLIGRAM(S): at 21:42

## 2022-10-13 RX ADMIN — Medication 2 MILLIUNIT(S)/MIN: at 08:51

## 2022-10-13 RX ADMIN — Medication 108 GRAM(S): at 09:01

## 2022-10-13 RX ADMIN — Medication 108 GRAM(S): at 13:03

## 2022-10-13 RX ADMIN — SODIUM CHLORIDE 1000 MILLILITER(S): 9 INJECTION, SOLUTION INTRAVENOUS at 19:32

## 2022-10-13 RX ADMIN — Medication 108 GRAM(S): at 21:31

## 2022-10-13 NOTE — OB PROVIDER LABOR PROGRESS NOTE - NSVAGINALEXAM_OBGYN_ALL_OB_DT
13-Oct-2022 10:30
13-Oct-2022 00:03
13-Oct-2022 05:20
13-Oct-2022 16:50
13-Oct-2022 19:30
13-Oct-2022 02:30
13-Oct-2022 06:48
13-Oct-2022 21:30

## 2022-10-13 NOTE — CONSULT NOTE ADULT - SUBJECTIVE AND OBJECTIVE BOX
Chief Complaint:  Patient is a 32y old  Female who presents with a chief complaint of     HPI: 32F with hx of hep B, 38 weeks pregnant presents for a scheduled IOL for elevated bilirubin & chronic Hepatitis B. Per patient, she has known longstanding history of hepatitis B, as does her mother and multiple siblings. She has had jaundice since her 20s, with intermittent flares. Patient was admitted to hospital in May at 19 weeks for worsening jaundice and elevated bilirubin of 9.7. Patient was seen by Hepatology and recommended to get MR/MRCP inpatient but left prior to MRI being completed. Patient states jaundice has worsened this pregnancy but that she always has some slight icterus prior to pregnancy.    At that time, hep E neg,  hep D neg, Hep B serology with +sAg, -sAb, -cIgM, -Be Ag, B .     Allergies:  No Known Allergies      Home Medications:    Hospital Medications:  ampicillin  IVPB 1 Gram(s) IV Intermittent every 4 hours  ampicillin  IVPB      influenza   Vaccine 0.5 milliLiter(s) IntraMuscular once  lactated ringers Bolus 500 milliLiter(s) IV Bolus once  misoprostol Oral Solution 40 MICROGram(s) Oral every 2 hours  oxytocin Infusion 333.333 milliUNIT(s)/Min IV Continuous <Continuous>  oxytocin Infusion. 2 milliUNIT(s)/Min IV Continuous <Continuous>  sodium chloride 0.9% Bolus 300 milliLiter(s) IV Bolus once  sodium chloride 0.9%. 1000 milliLiter(s) IV Continuous <Continuous>      PMHX/PSHX:  Hepatitis B    CAD (coronary artery disease)    No significant past surgical history        Family history:  FH: hypertension (Father)    FH: diabetes mellitus (Father)    FH: CAD (coronary artery disease) (Father)        Social History:     ROS:     General:  No weight loss, fevers, chills, night sweats, fatigue  Eyes:  No vision changes, no yellowing of eyes   ENT:  No throat pain, runny nose  CV:  No chest pain, palpitations  Resp:  No SOB, cough, wheezing  GI:  See HPI  :  No burning with urination, no hematuria   Muscle:  No muscle pain, weakness  Neuro:  No numbness/tingling, memory problems  Psych:  No fatigue, insomnia, mood problems  Heme:  No easy bruisability  Skin:  No rash, itching       PHYSICAL EXAM:     GENERAL:  Appears stated age, well-groomed, well-nourished, no distress  HEENT:  NC/AT,  conjunctivae clear and pink,  no JVD  CHEST:  Full & symmetric excursion, no increased effort, breath sounds clear  HEART:  Regular rhythm, S1, S2, no murmur/rub/S3/S4, no abdominal bruit, no edema  ABDOMEN:  Soft, non-tender, non-distended, normoactive bowel sounds,  no masses ,  EXTREMITIES:  no cyanosis,clubbing or edema  SKIN:  No rash/erythema/ecchymoses/petechiae/wounds/abscess/warm/dry  NEURO:  Alert, oriented    Vital Signs:  Vital Signs Last 24 Hrs  T(C): 36.8 (13 Oct 2022 12:00), Max: 37.6 (13 Oct 2022 00:27)  T(F): 98.24 (13 Oct 2022 12:00), Max: 99.68 (13 Oct 2022 00:27)  HR: 55 (13 Oct 2022 11:52) (53 - 93)  BP: 130/72 (13 Oct 2022 11:52) (92/56 - 158/78)  BP(mean): --  RR: 17 (12 Oct 2022 14:50) (17 - 17)  SpO2: 100% (13 Oct 2022 06:06) (96% - 100%)    Parameters below as of 12 Oct 2022 14:50  Patient On (Oxygen Delivery Method): room air      Daily Height in cm: 149.86 (12 Oct 2022 14:50)    Daily Weight Pre-pregnancy in k (12 Oct 2022 14:50)    LABS:                        11.1   14.62 )-----------( 177      ( 13 Oct 2022 01:45 )             32.5     10-13    137  |  106  |  12  ----------------------------<  82  4.1   |  18<L>  |  0.50    Ca    9.0      13 Oct 2022 01:45    TPro  6.3  /  Alb  3.2<L>  /  TBili  9.8<H>  /  DBili  x   /  AST  23  /  ALT  14  /  AlkPhos  182<H>  10-13    LIVER FUNCTIONS - ( 13 Oct 2022 01:45 )  Alb: 3.2 g/dL / Pro: 6.3 g/dL / ALK PHOS: 182 U/L / ALT: 14 U/L / AST: 23 U/L / GGT: x           PT/INR - ( 13 Oct 2022 01:45 )   PT: 11.3 sec;   INR: 0.97 ratio         PTT - ( 13 Oct 2022 01:45 )  PTT:28.7 sec  Urinalysis Basic - ( 13 Oct 2022 01:45 )    Color: Iona / Appearance: Clear / S.017 / pH: x  Gluc: x / Ketone: Negative  / Bili: Large / Urobili: 3 mg/dL   Blood: x / Protein: Trace / Nitrite: Negative   Leuk Esterase: Negative / RBC: 3 /HPF / WBC 1 /HPF   Sq Epi: x / Non Sq Epi: 1 /HPF / Bacteria: Negative          Imaging:             Chief Complaint:  Patient is a 32y old  Female who presents with a chief complaint of     HPI: 32F with hx of hep B, 38 weeks pregnant presents for a scheduled IOL for elevated bilirubin & chronic Hepatitis B. Per patient, she has known longstanding history of hepatitis B, as does her mother and multiple siblings. She has had jaundice since her 20s, with intermittent flares. Patient was admitted to hospital in May at 19 weeks for worsening jaundice and elevated bilirubin of 9.7. Patient was seen by Hepatology and recommended to get MR/MRCP inpatient but left prior to MRI being completed. Patient states jaundice has worsened this pregnancy but that she always has some slight icterus prior to pregnancy.    At that time, hep E neg,  hep D neg, Hep B serology with +sAg, -sAb, -cIgM, -Be Ag, B .     Allergies:  No Known Allergies      Home Medications:    Hospital Medications:  ampicillin  IVPB 1 Gram(s) IV Intermittent every 4 hours  ampicillin  IVPB      influenza   Vaccine 0.5 milliLiter(s) IntraMuscular once  lactated ringers Bolus 500 milliLiter(s) IV Bolus once  misoprostol Oral Solution 40 MICROGram(s) Oral every 2 hours  oxytocin Infusion 333.333 milliUNIT(s)/Min IV Continuous <Continuous>  oxytocin Infusion. 2 milliUNIT(s)/Min IV Continuous <Continuous>  sodium chloride 0.9% Bolus 300 milliLiter(s) IV Bolus once  sodium chloride 0.9%. 1000 milliLiter(s) IV Continuous <Continuous>      PMHX/PSHX:  Hepatitis B    CAD (coronary artery disease)    No significant past surgical history        Family history:  FH: hypertension (Father)    FH: diabetes mellitus (Father)    FH: CAD (coronary artery disease) (Father)        Social History:     ROS:     General:  No weight loss, fevers, chills, night sweats, fatigue  Eyes:  No vision changes, no yellowing of eyes   ENT:  No throat pain, runny nose  CV:  No chest pain, palpitations  Resp:  No SOB, cough, wheezing  GI:  See HPI  :  No burning with urination, no hematuria   Muscle:  No muscle pain, weakness  Neuro:  No numbness/tingling, memory problems  Psych:  No fatigue, insomnia, mood problems  Heme:  No easy bruisability  Skin:  No rash, itching       PHYSICAL EXAM:     GENERAL:  no distress  HEENT:  NC/AT,  conjunctivae clear and pink, scleral icterus  CHEST:  Full & symmetric excursion, no increased effort, breath sounds clear  HEART:  Regular rhythm, S1, S2, no murmur/rub/S3/S4, no abdominal bruit, no edema  ABDOMEN:  gravid abdomen  EXTREMITIES:  no cyanosis,clubbing or edema  SKIN:  No rash/erythema/ecchymoses/petechiae/wounds/abscess/warm/dry  NEURO:  Alert, oriented    Vital Signs:  Vital Signs Last 24 Hrs  T(C): 36.8 (13 Oct 2022 12:00), Max: 37.6 (13 Oct 2022 00:27)  T(F): 98.24 (13 Oct 2022 12:00), Max: 99.68 (13 Oct 2022 00:27)  HR: 55 (13 Oct 2022 11:52) (53 - 93)  BP: 130/72 (13 Oct 2022 11:52) (92/56 - 158/78)  BP(mean): --  RR: 17 (12 Oct 2022 14:50) (17 - 17)  SpO2: 100% (13 Oct 2022 06:06) (96% - 100%)    Parameters below as of 12 Oct 2022 14:50  Patient On (Oxygen Delivery Method): room air      Daily Height in cm: 149.86 (12 Oct 2022 14:50)    Daily Weight Pre-pregnancy in k (12 Oct 2022 14:50)    LABS:                        11.1   14.62 )-----------( 177      ( 13 Oct 2022 01:45 )             32.5     10-13    137  |  106  |  12  ----------------------------<  82  4.1   |  18<L>  |  0.50    Ca    9.0      13 Oct 2022 01:45    TPro  6.3  /  Alb  3.2<L>  /  TBili  9.8<H>  /  DBili  x   /  AST  23  /  ALT  14  /  AlkPhos  182<H>  1013    LIVER FUNCTIONS - ( 13 Oct 2022 01:45 )  Alb: 3.2 g/dL / Pro: 6.3 g/dL / ALK PHOS: 182 U/L / ALT: 14 U/L / AST: 23 U/L / GGT: x           PT/INR - ( 13 Oct 2022 01:45 )   PT: 11.3 sec;   INR: 0.97 ratio         PTT - ( 13 Oct 2022 01:45 )  PTT:28.7 sec  Urinalysis Basic - ( 13 Oct 2022 01:45 )    Color: [place]ona / Appearance: Clear / S.017 / pH: x  Gluc: x / Ketone: Negative  / Bili: Large / Urobili: 3 mg/dL   Blood: x / Protein: Trace / Nitrite: Negative   Leuk Esterase: Negative / RBC: 3 /HPF / WBC 1 /HPF   Sq Epi: x / Non Sq Epi: 1 /HPF / Bacteria: Negative          Imaging:

## 2022-10-13 NOTE — OB RN DELIVERY SUMMARY - NSSELHIDDEN_OBGYN_ALL_OB_FT
[NS_DeliveryAttending1_OBGYN_ALL_OB_FT:[id number]],[NS_DeliveryRN_OBGYN_ALL_OB_FT:[id number]] [NS_DeliveryAttending1_OBGYN_ALL_OB_FT:[id number]=],[NS_DeliveryRN_OBGYN_ALL_OB_FT:[email or web address]=],[NS_DeliveryAssist1_OBGYN_ALL_OB_FT:[id number]]

## 2022-10-13 NOTE — OB PROVIDER LABOR PROGRESS NOTE - NS_OBIHICONTRACTIONPATTERNDETAILS_OBGYN_ALL_OB_FT
q2-4
q2min
q3-4 min
q2-4
q2min
q3min
q3min
every 1-2 mins
Complex Repair And Flap Additional Text (Will Appearing After The Standard Complex Repair Text): The complex repair was not sufficient to completely close the primary defect. The remaining additional defect was repaired with the flap mentioned below.

## 2022-10-13 NOTE — OB PROVIDER LABOR PROGRESS NOTE - NS_SUBJECTIVE/OBJECTIVE_OBGYN_ALL_OB_FT
Patient seen for variable and late decelerations  Patient repositioned, IUPC placed by NP Redze, IVF bolus started
Delayed note 2/2 to clinical duties  Patient examined for cervical change at approx 5pm  Reports feeling uncomfortable with contractions, requesting epidural top off
comfortable with epidural infusing
Pt evaluated to assess cervical change
Pt evaluated to assess cervical change
Pt evaluated to assess cervical change.
Pt evaluated to place cervical balloon after patient ruptured spontaneously
R3 Labor Note     Patient examined s/p 3min decel with spontaneous return to baseline. Pit@6mU, paused at time of decel. Exam unchanged

## 2022-10-13 NOTE — CONSULT NOTE ADULT - ATTENDING COMMENTS
This patient has chronic HBV but is not on antivirals due to low viral replication. Ultrasound has shown normal liver with no evidence of cirrhosis and aminotransferase values are normal. This suggests there is very low chance of advanced liver disease with portal hypertension.  The viral load is low and mother does not require HBV antiviral medication to protect baby from infection. The infant should have protocol protection from HBV with HBIG/vaccine.   has been vaccinated against HBV.    The jaundice is reportedly life long and involves patient , her mother and one sister. 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 The absence of itching suggest bile acid excretion is intact.     Suggest total and individual serum bile acid measurement.  Corporoporphyrin total and fractions in 24 hour urine.  serum GGT and direct and total bilirubin.  Following delivery abdominal MRI w/wo contrast.

## 2022-10-13 NOTE — OB RN DELIVERY SUMMARY - NS_SEPSISRSKCALC_OBGYN_ALL_OB_FT
EOS calculated successfully. EOS Risk Factor: 0.5/1000 live births ([facility] national incidence); GA=38w6d; Temp=99.68; ROM=24.25; GBS='Positive'; Antibiotics='Broad spectrum antibiotics > 4 hrs prior to birth'   EOS calculated successfully. EOS Risk Factor: 0.5/1000 live births ([facility] national incidence); GA=38w6d; Temp=99.68; ROM=24.25; GBS='Positive'; Antibiotics='GBS specific antibiotics > 2 hrs prior to birth'

## 2022-10-13 NOTE — OB PROVIDER LABOR PROGRESS NOTE - ASSESSMENT
Pt is making cervical change. Continue with current management.     D/w Dr. Mickey Hernandez PGY1
Continue resuscitation and repositioning  Consider terb if contractions do not space out  Consider AI if tracing does not improve    Patient seen with TRISTON Diaz and Dr. Mickey Vail PGY1
39 weeks with RR IOL  - continue with pitocin  -comfort measures given
Continue with current management.   -Benadryl x1 to help with cervical edema  -place patient on L side to help dilate cervix    D/w Dr. Tricia Hernandez PGY1
Pt has made cervical change. Cervical balloon placed successfully.    D/w Dr. Mickey Hernandez PGY1
Plan   pitocin paused, restart at 3mU after 20min sustained Cat 1 tracing   further resuscitation PRN   Cont EFM/[place]  anticipate      Brook Crabtree MD PGY3   d/w Dr. Jimenez 
Patient with no cervical dilation  Patient SROM @1am and on pitocin since 9am  C/w pitocin for now  Consider  section if no cervical change >12 hours on pitocin    d/w Dr. Barbara Vail PGY1
Cervical balloon removed from vagina. Patient has made cervical change. Transfer patient to labor room and continue current management.     D/w Dr. Mickey Hernandez PGY1

## 2022-10-13 NOTE — OB PROVIDER LABOR PROGRESS NOTE - NS_OBIHIFHRDETAILS_OBGYN_ALL_OB_FT
150bpm, moderate variability, +accels, +intermittent variable and late decelerations
Cat 1 with no decels noted, moderate variability
baseline 140  moderate variability  +accels  +intermittent decels
baseline 140  moderate variability  +accels  -decels
150bpm, moderate variability, no accelerations, +variable and late decelerations
baseline 135  moderate variability  +accels  -decels
baseline 145  moderate variability  -accels  -decels
s/p 3min decel with return to baseline

## 2022-10-13 NOTE — OB RN DELIVERY SUMMARY - AS DELIV COMPLICATIONS OB
meconium stained fluid/pre eclampsia prolonged rupture of membranes/meconium stained fluid/pre eclampsia

## 2022-10-14 LAB
ALBUMIN SERPL ELPH-MCNC: 2.3 G/DL — LOW (ref 3.3–5)
ALP SERPL-CCNC: 136 U/L — HIGH (ref 40–120)
ALT FLD-CCNC: 11 U/L — SIGNIFICANT CHANGE UP (ref 4–33)
ANION GAP SERPL CALC-SCNC: 11 MMOL/L — SIGNIFICANT CHANGE UP (ref 7–14)
AST SERPL-CCNC: 24 U/L — SIGNIFICANT CHANGE UP (ref 4–32)
BILIRUB DIRECT SERPL-MCNC: 8.6 MG/DL — HIGH (ref 0–0.3)
BILIRUB SERPL-MCNC: 9.7 MG/DL — HIGH (ref 0.2–1.2)
BUN SERPL-MCNC: 12 MG/DL — SIGNIFICANT CHANGE UP (ref 7–23)
CALCIUM SERPL-MCNC: 8.3 MG/DL — LOW (ref 8.4–10.5)
CHLORIDE SERPL-SCNC: 105 MMOL/L — SIGNIFICANT CHANGE UP (ref 98–107)
CO2 SERPL-SCNC: 18 MMOL/L — LOW (ref 22–31)
CREAT SERPL-MCNC: 0.62 MG/DL — SIGNIFICANT CHANGE UP (ref 0.5–1.3)
EGFR: 120 ML/MIN/1.73M2 — SIGNIFICANT CHANGE UP
GLUCOSE SERPL-MCNC: 113 MG/DL — HIGH (ref 70–99)
HBV DNA # SERPL NAA+PROBE: 186 IU/ML — SIGNIFICANT CHANGE UP
HBV DNA # SERPL NAA+PROBE: DETECTED
HBV DNA SERPL NAA+PROBE-LOG#: 2.27 LOGIU/ML — SIGNIFICANT CHANGE UP
HBV E AB SER-ACNC: REACTIVE
HBV E AG SER-ACNC: SIGNIFICANT CHANGE UP
HBV SURFACE AB SER-ACNC: SIGNIFICANT CHANGE UP
HBV SURFACE AG SERPL QL IA: REACTIVE
HCV AB S/CO SERPL IA: 0.09 S/CO — SIGNIFICANT CHANGE UP (ref 0–0.99)
HCV AB SERPL-IMP: SIGNIFICANT CHANGE UP
KLEIHAUER-BETKE CALCULATION: 0.11 % — SIGNIFICANT CHANGE UP
POTASSIUM SERPL-MCNC: 4.1 MMOL/L — SIGNIFICANT CHANGE UP (ref 3.5–5.3)
POTASSIUM SERPL-SCNC: 4.1 MMOL/L — SIGNIFICANT CHANGE UP (ref 3.5–5.3)
PROT SERPL-MCNC: 5 G/DL — LOW (ref 6–8.3)
SODIUM SERPL-SCNC: 134 MMOL/L — LOW (ref 135–145)

## 2022-10-14 PROCEDURE — 88307 TISSUE EXAM BY PATHOLOGIST: CPT | Mod: 26

## 2022-10-14 PROCEDURE — 99232 SBSQ HOSP IP/OBS MODERATE 35: CPT | Mod: GC

## 2022-10-14 RX ORDER — OXYTOCIN 10 UNIT/ML
333.33 VIAL (ML) INJECTION
Qty: 20 | Refills: 0 | Status: DISCONTINUED | OUTPATIENT
Start: 2022-10-14 | End: 2022-10-14

## 2022-10-14 RX ORDER — DIPHENHYDRAMINE HCL 50 MG
25 CAPSULE ORAL EVERY 6 HOURS
Refills: 0 | Status: DISCONTINUED | OUTPATIENT
Start: 2022-10-14 | End: 2022-10-17

## 2022-10-14 RX ORDER — TETANUS TOXOID, REDUCED DIPHTHERIA TOXOID AND ACELLULAR PERTUSSIS VACCINE, ADSORBED 5; 2.5; 8; 8; 2.5 [IU]/.5ML; [IU]/.5ML; UG/.5ML; UG/.5ML; UG/.5ML
0.5 SUSPENSION INTRAMUSCULAR ONCE
Refills: 0 | Status: DISCONTINUED | OUTPATIENT
Start: 2022-10-14 | End: 2022-10-17

## 2022-10-14 RX ORDER — MAGNESIUM HYDROXIDE 400 MG/1
30 TABLET, CHEWABLE ORAL
Refills: 0 | Status: DISCONTINUED | OUTPATIENT
Start: 2022-10-14 | End: 2022-10-17

## 2022-10-14 RX ORDER — SODIUM CHLORIDE 9 MG/ML
1000 INJECTION, SOLUTION INTRAVENOUS ONCE
Refills: 0 | Status: COMPLETED | OUTPATIENT
Start: 2022-10-14 | End: 2022-10-14

## 2022-10-14 RX ORDER — ACETAMINOPHEN 500 MG
3 TABLET ORAL
Qty: 0 | Refills: 0 | DISCHARGE
Start: 2022-10-14

## 2022-10-14 RX ORDER — CHLORHEXIDINE GLUCONATE 213 G/1000ML
1 SOLUTION TOPICAL DAILY
Refills: 0 | Status: DISCONTINUED | OUTPATIENT
Start: 2022-10-14 | End: 2022-10-14

## 2022-10-14 RX ORDER — KETOROLAC TROMETHAMINE 30 MG/ML
30 SYRINGE (ML) INJECTION EVERY 6 HOURS
Refills: 0 | Status: DISCONTINUED | OUTPATIENT
Start: 2022-10-14 | End: 2022-10-15

## 2022-10-14 RX ORDER — IBUPROFEN 200 MG
600 TABLET ORAL EVERY 6 HOURS
Refills: 0 | Status: COMPLETED | OUTPATIENT
Start: 2022-10-14 | End: 2023-09-12

## 2022-10-14 RX ORDER — OXYCODONE HYDROCHLORIDE 5 MG/1
5 TABLET ORAL
Refills: 0 | Status: DISCONTINUED | OUTPATIENT
Start: 2022-10-14 | End: 2022-10-17

## 2022-10-14 RX ORDER — FAMOTIDINE 10 MG/ML
20 INJECTION INTRAVENOUS ONCE
Refills: 0 | Status: COMPLETED | OUTPATIENT
Start: 2022-10-14 | End: 2022-10-14

## 2022-10-14 RX ORDER — LANOLIN
1 OINTMENT (GRAM) TOPICAL EVERY 6 HOURS
Refills: 0 | Status: DISCONTINUED | OUTPATIENT
Start: 2022-10-14 | End: 2022-10-17

## 2022-10-14 RX ORDER — IBUPROFEN 200 MG
1 TABLET ORAL
Qty: 0 | Refills: 0 | DISCHARGE
Start: 2022-10-14

## 2022-10-14 RX ORDER — ACETAMINOPHEN 500 MG
975 TABLET ORAL
Refills: 0 | Status: DISCONTINUED | OUTPATIENT
Start: 2022-10-14 | End: 2022-10-14

## 2022-10-14 RX ORDER — OXYCODONE HYDROCHLORIDE 5 MG/1
5 TABLET ORAL ONCE
Refills: 0 | Status: DISCONTINUED | OUTPATIENT
Start: 2022-10-14 | End: 2022-10-17

## 2022-10-14 RX ORDER — HEPARIN SODIUM 5000 [USP'U]/ML
5000 INJECTION INTRAVENOUS; SUBCUTANEOUS EVERY 12 HOURS
Refills: 0 | Status: DISCONTINUED | OUTPATIENT
Start: 2022-10-14 | End: 2022-10-17

## 2022-10-14 RX ORDER — CITRIC ACID/SODIUM CITRATE 300-500 MG
30 SOLUTION, ORAL ORAL ONCE
Refills: 0 | Status: COMPLETED | OUTPATIENT
Start: 2022-10-14 | End: 2022-10-14

## 2022-10-14 RX ORDER — ONDANSETRON 8 MG/1
4 TABLET, FILM COATED ORAL ONCE
Refills: 0 | Status: COMPLETED | OUTPATIENT
Start: 2022-10-14 | End: 2022-10-14

## 2022-10-14 RX ORDER — SODIUM CHLORIDE 9 MG/ML
1000 INJECTION, SOLUTION INTRAVENOUS
Refills: 0 | Status: DISCONTINUED | OUTPATIENT
Start: 2022-10-14 | End: 2022-10-17

## 2022-10-14 RX ORDER — SIMETHICONE 80 MG/1
80 TABLET, CHEWABLE ORAL EVERY 4 HOURS
Refills: 0 | Status: DISCONTINUED | OUTPATIENT
Start: 2022-10-14 | End: 2022-10-17

## 2022-10-14 RX ADMIN — HEPARIN SODIUM 5000 UNIT(S): 5000 INJECTION INTRAVENOUS; SUBCUTANEOUS at 10:40

## 2022-10-14 RX ADMIN — Medication 30 MILLIGRAM(S): at 17:53

## 2022-10-14 RX ADMIN — Medication 30 MILLIGRAM(S): at 11:03

## 2022-10-14 RX ADMIN — Medication 30 MILLILITER(S): at 00:42

## 2022-10-14 RX ADMIN — Medication 1000 MILLIUNIT(S)/MIN: at 02:11

## 2022-10-14 RX ADMIN — HEPARIN SODIUM 5000 UNIT(S): 5000 INJECTION INTRAVENOUS; SUBCUTANEOUS at 23:38

## 2022-10-14 RX ADMIN — SODIUM CHLORIDE 2000 MILLILITER(S): 9 INJECTION, SOLUTION INTRAVENOUS at 12:17

## 2022-10-14 RX ADMIN — Medication 30 MILLIGRAM(S): at 23:38

## 2022-10-14 RX ADMIN — Medication 30 MILLIGRAM(S): at 18:28

## 2022-10-14 RX ADMIN — ONDANSETRON 4 MILLIGRAM(S): 8 TABLET, FILM COATED ORAL at 07:03

## 2022-10-14 RX ADMIN — Medication 30 MILLIGRAM(S): at 10:40

## 2022-10-14 RX ADMIN — FAMOTIDINE 20 MILLIGRAM(S): 10 INJECTION INTRAVENOUS at 00:42

## 2022-10-14 NOTE — PROGRESS NOTE ADULT - SUBJECTIVE AND OBJECTIVE BOX
Chief Complaint:  Patient is a 32y old  Female who presents with a chief complaint of 38.6 with Chronic Hep B infection and PEC without SF (14 Oct 2022 02:32)      Interval Events: underwent C section overnight      Hospital Medications:  acetaminophen     Tablet .. 975 milliGRAM(s) Oral <User Schedule>  diphenhydrAMINE 25 milliGRAM(s) Oral every 6 hours PRN  diphtheria/tetanus/pertussis (acellular) Vaccine (ADAcel) 0.5 milliLiter(s) IntraMuscular once  heparin   Injectable 5000 Unit(s) SubCutaneous every 12 hours  ibuprofen  Tablet. 600 milliGRAM(s) Oral every 6 hours  ketorolac   Injectable 30 milliGRAM(s) IV Push every 6 hours  lactated ringers. 1000 milliLiter(s) IV Continuous <Continuous>  lanolin Ointment 1 Application(s) Topical every 6 hours PRN  magnesium hydroxide Suspension 30 milliLiter(s) Oral two times a day PRN  oxyCODONE    IR 5 milliGRAM(s) Oral every 3 hours PRN  oxyCODONE    IR 5 milliGRAM(s) Oral once PRN  oxytocin Infusion 333.333 milliUNIT(s)/Min IV Continuous <Continuous>  simethicone 80 milliGRAM(s) Chew every 4 hours PRN      PMHX/PSHX:  Hepatitis B    CAD (coronary artery disease)    No significant past surgical history            ROS:     General:  No weight loss, fevers, chills, night sweats, fatigue   Eyes:  No vision changes  ENT:  No sore throat, pain, runny nose  CV:  No chest pain, palpitations, dizziness   Resp:  No SOB, cough, wheezing  GI:  See HPI  :  No burning with urination, hematuria  Muscle:  No pain, weakness  Neuro:  No weakness/tingling, memory problems  Psych:  No fatigue, insomnia, mood problems, depression  Heme:  No easy bruisability  Skin:  No rash, edema      PHYSICAL EXAM:     GENERAL:  Well developed, no distress  HEENT:  NC/AT,  conjunctivae clear, sclera anicteric  CHEST:  Full & symmetric excursion, no increased effort w/ respirations  HEART:  Regular rhythm & rate  ABDOMEN:  Soft, non-tender, non-distended  EXTREMITIES:  no LE  edema  SKIN:  No rash/erythema/ecchymoses/petechiae/wounds/jaundice  NEURO:  Alert, oriented    Vital Signs:  Vital Signs Last 24 Hrs  T(C): 36.7 (14 Oct 2022 06:41), Max: 37.0 (13 Oct 2022 18:15)  T(F): 98.1 (14 Oct 2022 06:41), Max: 98.6 (13 Oct 2022 18:15)  HR: 74 (14 Oct 2022 06:41) (55 - 118)  BP: 117/68 (14 Oct 2022 06:41) (108/71 - 152/76)  BP(mean): 99 (14 Oct 2022 06:00) (69 - 99)  RR: 18 (14 Oct 2022 06:41) (15 - 22)  SpO2: 96% (14 Oct 2022 06:41) (89% - 100%)    Parameters below as of 14 Oct 2022 06:41  Patient On (Oxygen Delivery Method): room air      Daily     Daily Baby A: Weight (gm) Delivery: 2950 (13 Oct 2022 22:14)    LABS:                        11.1   17.30 )-----------( 172      ( 13 Oct 2022 17:30 )             33.4     10-14    134<L>  |  105  |  12  ----------------------------<  113<H>  4.1   |  18<L>  |  0.62    Ca    8.3<L>      14 Oct 2022 05:50    TPro  5.0<L>  /  Alb  2.3<L>  /  TBili  9.7<H>  /  DBili  x   /  AST  24  /  ALT  11  /  AlkPhos  136<H>  10-14    LIVER FUNCTIONS - ( 14 Oct 2022 05:50 )  Alb: 2.3 g/dL / Pro: 5.0 g/dL / ALK PHOS: 136 U/L / ALT: 11 U/L / AST: 24 U/L / GGT: x           PT/INR - ( 13 Oct 2022 17:30 )   PT: 11.5 sec;   INR: 0.99 ratio         PTT - ( 13 Oct 2022 17:30 )  PTT:27.9 sec  Urinalysis Basic - ( 13 Oct 2022 01:45 )    Color: Iona / Appearance: Clear / S.017 / pH: x  Gluc: x / Ketone: Negative  / Bili: Large / Urobili: 3 mg/dL   Blood: x / Protein: Trace / Nitrite: Negative   Leuk Esterase: Negative / RBC: 3 /HPF / WBC 1 /HPF   Sq Epi: x / Non Sq Epi: 1 /HPF / Bacteria: Negative          Imaging:             Chief Complaint:  Patient is a 32y old  Female who presents with a chief complaint of 38.6 with Chronic Hep B infection and PEC without SF (14 Oct 2022 02:32)      Interval Events: underwent C section overnight  - reports tired this morning however denies much pain      Hospital Medications:  acetaminophen     Tablet .. 975 milliGRAM(s) Oral <User Schedule>  diphenhydrAMINE 25 milliGRAM(s) Oral every 6 hours PRN  diphtheria/tetanus/pertussis (acellular) Vaccine (ADAcel) 0.5 milliLiter(s) IntraMuscular once  heparin   Injectable 5000 Unit(s) SubCutaneous every 12 hours  ibuprofen  Tablet. 600 milliGRAM(s) Oral every 6 hours  ketorolac   Injectable 30 milliGRAM(s) IV Push every 6 hours  lactated ringers. 1000 milliLiter(s) IV Continuous <Continuous>  lanolin Ointment 1 Application(s) Topical every 6 hours PRN  magnesium hydroxide Suspension 30 milliLiter(s) Oral two times a day PRN  oxyCODONE    IR 5 milliGRAM(s) Oral every 3 hours PRN  oxyCODONE    IR 5 milliGRAM(s) Oral once PRN  oxytocin Infusion 333.333 milliUNIT(s)/Min IV Continuous <Continuous>  simethicone 80 milliGRAM(s) Chew every 4 hours PRN      PMHX/PSHX:  Hepatitis B    CAD (coronary artery disease)    No significant past surgical history            ROS:     General:  No weight loss, fevers, chills, night sweats, fatigue   Eyes:  No vision changes  ENT:  No sore throat, pain, runny nose  CV:  No chest pain, palpitations, dizziness   Resp:  No SOB, cough, wheezing  GI:  See HPI  :  No burning with urination, hematuria  Muscle:  No pain, weakness  Neuro:  No weakness/tingling, memory problems  Psych:  No fatigue, insomnia, mood problems, depression  Heme:  No easy bruisability  Skin:  No rash, edema      PHYSICAL EXAM:     GENERAL:  Well developed, no distress  HEENT:  NC/AT,  conjunctivae clear, sclera anicteric  CHEST:  Full & symmetric excursion, no increased effort w/ respirations  HEART:  Regular rhythm & rate  ABDOMEN:  Soft, c section incision noted  EXTREMITIES:  no LE  edema  SKIN:  No rash/erythema/ecchymoses/petechiae/wounds/jaundice  NEURO:  Alert, oriented    Vital Signs:  Vital Signs Last 24 Hrs  T(C): 36.7 (14 Oct 2022 06:41), Max: 37.0 (13 Oct 2022 18:15)  T(F): 98.1 (14 Oct 2022 06:41), Max: 98.6 (13 Oct 2022 18:15)  HR: 74 (14 Oct 2022 06:41) (55 - 118)  BP: 117/68 (14 Oct 2022 06:41) (108/71 - 152/76)  BP(mean): 99 (14 Oct 2022 06:00) (69 - 99)  RR: 18 (14 Oct 2022 06:41) (15 - 22)  SpO2: 96% (14 Oct 2022 06:41) (89% - 100%)    Parameters below as of 14 Oct 2022 06:41  Patient On (Oxygen Delivery Method): room air      Daily     Daily Baby A: Weight (gm) Delivery: 2950 (13 Oct 2022 22:14)    LABS:                        11.1   17.30 )-----------( 172      ( 13 Oct 2022 17:30 )             33.4     10-14    134<L>  |  105  |  12  ----------------------------<  113<H>  4.1   |  18<L>  |  0.62    Ca    8.3<L>      14 Oct 2022 05:50    TPro  5.0<L>  /  Alb  2.3<L>  /  TBili  9.7<H>  /  DBili  x   /  AST  24  /  ALT  11  /  AlkPhos  136<H>  10-14    LIVER FUNCTIONS - ( 14 Oct 2022 05:50 )  Alb: 2.3 g/dL / Pro: 5.0 g/dL / ALK PHOS: 136 U/L / ALT: 11 U/L / AST: 24 U/L / GGT: x           PT/INR - ( 13 Oct 2022 17:30 )   PT: 11.5 sec;   INR: 0.99 ratio         PTT - ( 13 Oct 2022 17:30 )  PTT:27.9 sec  Urinalysis Basic - ( 13 Oct 2022 01:45 )    Color: Iona / Appearance: Clear / S.017 / pH: x  Gluc: x / Ketone: Negative  / Bili: Large / Urobili: 3 mg/dL   Blood: x / Protein: Trace / Nitrite: Negative   Leuk Esterase: Negative / RBC: 3 /HPF / WBC 1 /HPF   Sq Epi: x / Non Sq Epi: 1 /HPF / Bacteria: Negative          Imaging:

## 2022-10-14 NOTE — PROGRESS NOTE ADULT - ASSESSMENT
32F with hx of hep B, 38 weeks pregnant presents for a scheduled IOL for elevated bilirubin & chronic Hepatitis B with chronically elevated bilirubin with concern for bilirubin metabolism defect.    Impression:  #Hyperbilirubinemia - chronic since her 20s per patient. Worsened during pregnancy. This can be seen in hep B in the setting of cirrhosis but on US and labs, there is low concern for cirrhosis and portal HTN. Unlikely 2/2 active inflammation from hep B given normal transaminases. Elevation of alk phos likely from placenta. Given elevation of conjugated fraction and chronicity concern for Rotor syndrome.  #Chronic hep B - low DNA PCR in May not requiring treatment in pregnancy    Recommendation:  - collect 24hr urine coproporphyrin   - send hep B e Ag, eAb sAg, sAb, cIgM  - f/u fractionated coproporphyrin and hep B DNA  - f/u bile acid level and bile acid fractionated  - please obtain MR abdomen with contrast to evaluate liver  - monitor CMP daily    Note not finalized until signed by attending.    Gita Jones PGY-6  Gastroenterology/Hepatology Fellow  Pager #[phone] ([place]) or [phone] ([place])  Available on [employer] Teams.  Please contact on-call GI fellow via answering service ([phone]) after 5pm and before 8am, and on weekends.

## 2022-10-14 NOTE — LACTATION INITIAL EVALUATION - INTERVENTION OUTCOME
To monitor for cracked and bleeding nipples and avoid breastfeeding at that time./verbalizes understanding/demonstrates understanding of teaching/good return demonstration/needs met/Lactation team to follow up

## 2022-10-14 NOTE — OB PROVIDER DELIVERY SUMMARY - NSSELHIDDEN_OBGYN_ALL_OB_FT
[NS_DeliveryAttending1_OBGYN_ALL_OB_FT:[id number]],[NS_DeliveryRN_OBGYN_ALL_OB_FT:[id number]],[NS_DeliveryAssist1_OBGYN_ALL_OB_FT:[id number]=]

## 2022-10-14 NOTE — DISCHARGE NOTE OB - PATIENT PORTAL LINK FT
You can access the FollowMyHealth Patient Portal offered by [facility] by registering at the following website: [email or web address]. By joining [facility]’s FollowMyHealth portal, you will also be able to view your health information using other applications (apps) compatible with our system.

## 2022-10-14 NOTE — DISCHARGE NOTE OB - MEDICATION SUMMARY - MEDICATIONS TO TAKE
I will START or STAY ON the medications listed below when I get home from the hospital:    acetaminophen 325 mg oral tablet  -- 3 tab(s) by mouth every 8 hours, As Needed  -- Indication: For pain    ibuprofen 600 mg oral tablet  -- 1 tab(s) by mouth every 6 hours, As Needed  -- Indication: For oain    Prenatal Multivitamins oral tablet  -- 1 tab(s) by mouth once a day  -- Indication: For continued nutritional need

## 2022-10-14 NOTE — PROGRESS NOTE ADULT - ATTENDING COMMENTS
Patient alert and without pain post  and successful delivery. Await HBV serology and HBV DNA quantitation.  Await corporophorins and bile acid levels. Suggest abdominal MRI w/wo contrast to evaluate liver and biliary anatomy.

## 2022-10-14 NOTE — DISCHARGE NOTE OB - CARE PROVIDER_API CALL
Christie Clarke (DO)  Obstetrics and Gynecology  [address] [place] [address]  Phone: [phone]  Follow Up Time:

## 2022-10-14 NOTE — OB RN INTRAOPERATIVE NOTE - NSSELHIDDEN_OBGYN_ALL_OB_FT
[NS_DeliveryAttending1_OBGYN_ALL_OB_FT:[id number]=],[NS_DeliveryRN_OBGYN_ALL_OB_FT:[email or web address]=],[NS_DeliveryAssist1_OBGYN_ALL_OB_FT:[id number]=]

## 2022-10-14 NOTE — DISCHARGE NOTE OB - MATERIALS PROVIDED
PP materials provided PP materials provided/Vaccinations/[place] Screening Program/  Immunization Record/Breastfeeding Log/Bottle Feeding Log/Breastfeeding Mother’s Support Group Information/Guide to Postpartum Care/[facility] Hearing Screen Program/Back To Sleep Handout/Shaken Baby Prevention Handout/Breastfeeding Guide and Packet/Birth Certificate Instructions/Discharge Medication Information for Patients and Families Pocket Guide/Tdap Vaccination (VIS Pub Date: 2012)

## 2022-10-14 NOTE — OB NEONATOLOGY/PEDIATRICIAN DELIVERY SUMMARY - NSPEDSNEONOTESA_OBGYN_ALL_OB_FT
Baby is a 38+6 wk GA male born to a 33 y/o  mother via C/S for arrest of dilation. PEDS called to delivery for meconium. Maternal history longstanding HBV and HAV, baseline elevated bilirubin exacerbated by pregnancy and jaundice. Hepatology recommended MR/MRCP but mom did not complete. Prenatal history dilated loops of bowel on 10/3/22 fetal ultrasound, normal target sign and FV. Maternal blood type O-. PNL HBV+, HIV negative, RPR negative, Rubella immune. GBS +, amp x7. SROM at 01:10 on 10/13, mec fluids. Baby born with low tone and poor respiratory effort. Warmed, dried, stimulated, suctioned. Required 1 min PPV and CPAP for poor respiratory effort which ultimately improved with extensive deep suctioning for heavy meconium. Apgars 7/8. EOS 0.33. Mom plans to breastfeed and consents hepB and HBIG.     BW: 2950 g  : 10/14/2022  TOB: 01:25    Physical Exam:  Gen: NAD, +grimace  HEENT: anterior fontanel open soft and flat, no cleft lip/palate, ears normal set, +molding  Resp: no increased work of breathing, symmetric chest rise  Cardio: regular rate and rhythm  Abd: soft, umbilical cord with 3 vessels  Back: spine midline,  Neuro: +grasp/suck/tosin/palmar/plantar, normal tone  Extremities: negative duron and ortolani, moving all extremities, full range of motion x 4, no crepitus  Skin: pink, warm  Genitals: Normal male anatomy, testicles palpable in scrotum bilateral, Escobar 1, anus patent Baby is a 38+6 wk GA male born to a 31 y/o  mother via C/S for arrest of dilation and PROM. PEDS called to delivery for meconium. Maternal history of pre-eclampsia, longstanding HBV and HAV, baseline elevated bilirubin exacerbated by pregnancy and jaundice. Hepatology recommended MR/MRCP but mom did not complete. Prenatal history of dilated loops of bowel on 10/3/22 fetal ultrasound, normal target sign and FV. Maternal blood type O-. PNL HBV+, HIV negative, RPR negative, Rubella immune. GBS +, amp x7. SROM at 01:10 on 10/13, mec fluids. Baby born with low tone and poor respiratory effort. Warmed, dried, stimulated, suctioned. Required 1 min PPV and CPAP for poor respiratory effort which ultimately improved with extensive deep suctioning for heavy meconium. Apgars 7/8. EOS 0.33. Mom plans to breastfeed and consents hepB and HBIG.     BW: 2950 g  : 10/14/2022  TOB: 01:25    Physical Exam:  Gen: NAD, +grimace  HEENT: anterior fontanel open soft and flat, no cleft lip/palate, ears normal set, +molding  Resp: no increased work of breathing, symmetric chest rise  Cardio: regular rate and rhythm  Abd: soft, umbilical cord with 3 vessels  Back: spine midline,  Neuro: +grasp/suck/tosin/palmar/plantar, normal tone  Extremities: negative duron and ortolani, moving all extremities, full range of motion x 4, no crepitus  Skin: pink, warm  Genitals: Normal male anatomy, testicles palpable in scrotum bilateral, Escobar 1, anus patent

## 2022-10-14 NOTE — DISCHARGE NOTE OB - CARE PLAN
1 Principal Discharge DX:	Term  delivered by  section, current hospitalization  Assessment and plan of treatment:	Term pregnancy with arrest of descent delivered via CS  Chronic Hep B infection  PEC without SF    routine post op care for above

## 2022-10-14 NOTE — DISCHARGE NOTE OB - HOSPITAL COURSE
38.6 with chronic hep b infection and PEC without SF   Arrest of descent  Cat 2 tracing     Routine post op care for above

## 2022-10-14 NOTE — OB PROVIDER DELIVERY SUMMARY - NSPROVIDERDELIVERYNOTE_OBGYN_ALL_OB_FT
Pre Op dx:  38.6 weeks with Chronic hep b  PEC w/o SF  Arrest of descent  Meconium stained fluid  Cat 2 tracing    Post dx: same  Procedure:  LTCS  surgeon: Tricia  anesthesia epidural    qbl 181cc  iv 1500cc  uo 40cc    Findings:  viable male infant 2950 grams, apgars of 7/8

## 2022-10-14 NOTE — DISCHARGE NOTE OB - PAIN IN THE CALVES OF YOUR LEGS
----- Message from Jovana Shen sent at 10/26/2020  1:40 PM CDT -----  Type:  Needs Medical Advice    Who Called: Pt  Symptoms (please be specific):    How long has patient had these symptoms:    Pharmacy name and phone #:    Would the patient rather a call back or a response via [facility]? Call   Best Call Back Number: [phone] (home)   Additional Information:   Caller is requesting  call back in regards to equipment that was supposed to be delivered today. please call back with any information        Statement Selected

## 2022-10-14 NOTE — LACTATION INITIAL EVALUATION - LACTATION INTERVENTIONS
initiate/review safe skin-to-skin/initiate/review hand expression/initiate/review techniques for position and latch/review techniques to manage sore nipples/engorgement/initiate/review breast massage/compression/reviewed components of an effective feeding and at least 8 effective feedings per day required/reviewed importance of monitoring infant diapers, the breastfeeding log, and minimum output each day/reviewed risks of unnecessary formula supplementation/reviewed risks of artificial nipples/reviewed strategies to transition to breastfeeding only/reviewed benefits and recommendations for rooming in/reviewed feeding on demand/by cue at least 8 times a day/reviewed indications of inadequate milk transfer that would require supplementation

## 2022-10-14 NOTE — DISCHARGE NOTE OB - PLAN OF CARE
Term pregnancy with arrest of descent delivered via CS  Chronic Hep B infection  PEC without SF    routine post op care for above

## 2022-10-15 LAB
ALBUMIN SERPL ELPH-MCNC: 2.3 G/DL — LOW (ref 3.3–5)
ALP SERPL-CCNC: 105 U/L — SIGNIFICANT CHANGE UP (ref 40–120)
ALT FLD-CCNC: 13 U/L — SIGNIFICANT CHANGE UP (ref 4–33)
ANION GAP SERPL CALC-SCNC: 11 MMOL/L — SIGNIFICANT CHANGE UP (ref 7–14)
APTT BLD: 28.2 SEC — SIGNIFICANT CHANGE UP (ref 27–36.3)
AST SERPL-CCNC: 23 U/L — SIGNIFICANT CHANGE UP (ref 4–32)
BASOPHILS # BLD AUTO: 0.04 K/UL — SIGNIFICANT CHANGE UP (ref 0–0.2)
BASOPHILS NFR BLD AUTO: 0.3 % — SIGNIFICANT CHANGE UP (ref 0–2)
BILIRUB SERPL-MCNC: 8.1 MG/DL — HIGH (ref 0.2–1.2)
BUN SERPL-MCNC: 18 MG/DL — SIGNIFICANT CHANGE UP (ref 7–23)
CALCIUM SERPL-MCNC: 8.5 MG/DL — SIGNIFICANT CHANGE UP (ref 8.4–10.5)
CHLORIDE SERPL-SCNC: 106 MMOL/L — SIGNIFICANT CHANGE UP (ref 98–107)
CO2 SERPL-SCNC: 20 MMOL/L — LOW (ref 22–31)
CREAT SERPL-MCNC: 0.68 MG/DL — SIGNIFICANT CHANGE UP (ref 0.5–1.3)
EGFR: 117 ML/MIN/1.73M2 — SIGNIFICANT CHANGE UP
EOSINOPHIL # BLD AUTO: 0.17 K/UL — SIGNIFICANT CHANGE UP (ref 0–0.5)
EOSINOPHIL NFR BLD AUTO: 1.4 % — SIGNIFICANT CHANGE UP (ref 0–6)
FIBRINOGEN PPP-MCNC: 830 MG/DL — HIGH (ref 330–520)
GLUCOSE SERPL-MCNC: 73 MG/DL — SIGNIFICANT CHANGE UP (ref 70–99)
HCT VFR BLD CALC: 26.3 % — LOW (ref 34.5–45)
HGB BLD-MCNC: 8.4 G/DL — LOW (ref 11.5–15.5)
IANC: 9.42 K/UL — HIGH (ref 1.8–7.4)
IMM GRANULOCYTES NFR BLD AUTO: 0.7 % — SIGNIFICANT CHANGE UP (ref 0–0.9)
INR BLD: 0.96 RATIO — SIGNIFICANT CHANGE UP (ref 0.88–1.16)
LYMPHOCYTES # BLD AUTO: 16.8 % — SIGNIFICANT CHANGE UP (ref 13–44)
LYMPHOCYTES # BLD AUTO: 2.09 K/UL — SIGNIFICANT CHANGE UP (ref 1–3.3)
MCHC RBC-ENTMCNC: 31.1 PG — SIGNIFICANT CHANGE UP (ref 27–34)
MCHC RBC-ENTMCNC: 31.9 GM/DL — LOW (ref 32–36)
MCV RBC AUTO: 97.4 FL — SIGNIFICANT CHANGE UP (ref 80–100)
MELD SCORE WITH DIALYSIS: 28 POINTS — SIGNIFICANT CHANGE UP
MELD SCORE WITHOUT DIALYSIS: 14 POINTS — SIGNIFICANT CHANGE UP
MONOCYTES # BLD AUTO: 0.61 K/UL — SIGNIFICANT CHANGE UP (ref 0–0.9)
MONOCYTES NFR BLD AUTO: 4.9 % — SIGNIFICANT CHANGE UP (ref 2–14)
NEUTROPHILS # BLD AUTO: 9.42 K/UL — HIGH (ref 1.8–7.4)
NEUTROPHILS NFR BLD AUTO: 75.9 % — SIGNIFICANT CHANGE UP (ref 43–77)
NRBC # BLD: 0 /100 WBCS — SIGNIFICANT CHANGE UP (ref 0–0)
NRBC # FLD: 0 K/UL — SIGNIFICANT CHANGE UP (ref 0–0)
PLATELET # BLD AUTO: 159 K/UL — SIGNIFICANT CHANGE UP (ref 150–400)
POTASSIUM SERPL-MCNC: 4.1 MMOL/L — SIGNIFICANT CHANGE UP (ref 3.5–5.3)
POTASSIUM SERPL-SCNC: 4.1 MMOL/L — SIGNIFICANT CHANGE UP (ref 3.5–5.3)
PROT SERPL-MCNC: 5.2 G/DL — LOW (ref 6–8.3)
PROTHROM AB SERPL-ACNC: 11.1 SEC — SIGNIFICANT CHANGE UP (ref 10.5–13.4)
RBC # BLD: 2.7 M/UL — LOW (ref 3.8–5.2)
RBC # FLD: 17.2 % — HIGH (ref 10.3–14.5)
SODIUM SERPL-SCNC: 137 MMOL/L — SIGNIFICANT CHANGE UP (ref 135–145)
WBC # BLD: 12.42 K/UL — HIGH (ref 3.8–10.5)
WBC # FLD AUTO: 12.42 K/UL — HIGH (ref 3.8–10.5)

## 2022-10-15 RX ORDER — IBUPROFEN 200 MG
600 TABLET ORAL EVERY 6 HOURS
Refills: 0 | Status: DISCONTINUED | OUTPATIENT
Start: 2022-10-15 | End: 2022-10-17

## 2022-10-15 RX ADMIN — Medication 600 MILLIGRAM(S): at 17:35

## 2022-10-15 RX ADMIN — HEPARIN SODIUM 5000 UNIT(S): 5000 INJECTION INTRAVENOUS; SUBCUTANEOUS at 12:59

## 2022-10-15 RX ADMIN — Medication 600 MILLIGRAM(S): at 18:36

## 2022-10-15 RX ADMIN — Medication 30 MILLIGRAM(S): at 05:22

## 2022-10-15 RX ADMIN — HEPARIN SODIUM 5000 UNIT(S): 5000 INJECTION INTRAVENOUS; SUBCUTANEOUS at 23:41

## 2022-10-15 RX ADMIN — Medication 600 MILLIGRAM(S): at 23:41

## 2022-10-15 RX ADMIN — Medication 30 MILLIGRAM(S): at 06:00

## 2022-10-15 RX ADMIN — Medication 30 MILLIGRAM(S): at 00:10

## 2022-10-15 NOTE — PROGRESS NOTE ADULT - ATTENDING COMMENTS
Patient seen at bedside . Agree with A & P above  no complaints   f/u MRI   f/u Hep recs   DC home tomorrow if results in and patient remains stable

## 2022-10-15 NOTE — PROGRESS NOTE ADULT - SUBJECTIVE AND OBJECTIVE BOX
OB Progress Note:  Delivery, POD#1    S: 33yo POD#1 s/p LTCS . Pain well controlled, tolerating regular diet, not yet passing flatus, ambulating without difficulty, voiding spontaneously. Denies heavy vaginal bleeding, N/V, CP/SOB/lightheadedness/dizziness.     O:   Vital Signs Last 24 Hrs  T(C): 36.5 (14 Oct 2022 21:58), Max: 37 (14 Oct 2022 10:27)  T(F): 97.7 (14 Oct 2022 21:58), Max: 98.6 (14 Oct 2022 10:27)  HR: 70 (14 Oct 2022 21:58) (58 - 74)  BP: 113/61 (14 Oct 2022 21:58) (113/61 - 151/84)  BP(mean): 99 (14 Oct 2022 06:00) (86 - 99)  RR: 15 (14 Oct 2022 21:58) (15 - 18)  SpO2: 100% (14 Oct 2022 21:58) (96% - 100%)    Parameters below as of 14 Oct 2022 21:58  Patient On (Oxygen Delivery Method): room air        Labs:  Blood type: O Negative  Rubella IgG: RPR: Negative                          11.1<L>   17.30<H> >-----------< 172    ( 10-13 @ 17:30 )             33.4<L>                        11.1<L>   14.62<H> >-----------< 177    ( 10-13 @ 01:45 )             32.5<L>                        10.3<L>   9.72 >-----------< 188    ( 10-12 @ 16:08 )             30.2<L>    10-14-22 @ 05:50      134<L>  |  105  |  12  ----------------------------<  113<H>  4.1   |  18<L>  |  0.62    10-13-22 @ 17:30      136  |  104  |  10  ----------------------------<  86  3.8   |  19<L>  |  0.56    10-13-22 @ 01:45      137  |  106  |  12  ----------------------------<  82  4.1   |  18<L>  |  0.50    10-12-22 @ 16:08      136  |  104  |  13  ----------------------------<  86  4.3   |  20<L>  |  0.59        Ca    8.3<L>      14 Oct 2022 05:50  Ca    8.5      13 Oct 2022 17:30  Ca    9.0      13 Oct 2022 01:45  Ca    9.1      12 Oct 2022 16:08    TPro  5.0<L>  /  Alb  2.3<L>  /  TBili  9.7<H>  /  DBili  8.6<H>  /  AST  24  /  ALT  11  /  AlkPhos  136<H>  10-14-22 @ 05:50  TPro  5.8<L>  /  Alb  2.8<L>  /  TBili  10.7<H>  /  DBili  x   /  AST  20  /  ALT  13  /  AlkPhos  174<H>  10-13-22 @ 17:30  TPro  6.3  /  Alb  3.2<L>  /  TBili  9.8<H>  /  DBili  x   /  AST  23  /  ALT  14  /  AlkPhos  182<H>  10-13-22 @ 01:45  TPro  5.8<L>  /  Alb  3.1<L>  /  TBili  10.2<H>  /  DBili  x   /  AST  23  /  ALT  17  /  AlkPhos  187<H>  10-12-22 @ 16:08          PE:  General: NAD  Abdomen: Mildly distended, appropriately tender, Fundus firm, incision c/d/i.  VE: No heavy vaginal bleeding  Extremities: No erythema, no pitting edema

## 2022-10-15 NOTE — PROGRESS NOTE ADULT - ASSESSMENT
A/P: 31yo POD#1 s/p LTCS.  Patient is stable and doing well post-operatively.    #  #Postpartum    - Continue regular diet  - Increase ambulation  - Continue motrin, tylenol, oxycodone PRN for pain control.    - f/u AM CBC    Too Judd, PGY-3 A/P: 33yo POD#1 s/p LTCS.  Patient is stable and doing well post-operatively.    #Elevated bilirubin  -LFTs wnl  -hepatology consulted; recs appreciated  -hepatology labs sent  -MRI abdomen pending    #Postpartum    - Continue regular diet  - Increase ambulation  - Continue motrin, tylenol, oxycodone PRN for pain control.    - f/u AM CBC    Too Judd, PGY-3 Class II - visualization of the soft palate, fauces, and uvula

## 2022-10-16 RX ADMIN — HEPARIN SODIUM 5000 UNIT(S): 5000 INJECTION INTRAVENOUS; SUBCUTANEOUS at 12:11

## 2022-10-16 RX ADMIN — Medication 600 MILLIGRAM(S): at 00:15

## 2022-10-16 RX ADMIN — Medication 600 MILLIGRAM(S): at 18:13

## 2022-10-16 RX ADMIN — Medication 600 MILLIGRAM(S): at 05:51

## 2022-10-16 RX ADMIN — Medication 600 MILLIGRAM(S): at 17:16

## 2022-10-16 RX ADMIN — Medication 600 MILLIGRAM(S): at 12:35

## 2022-10-16 RX ADMIN — Medication 600 MILLIGRAM(S): at 12:04

## 2022-10-16 RX ADMIN — Medication 600 MILLIGRAM(S): at 06:30

## 2022-10-16 NOTE — PROGRESS NOTE ADULT - ASSESSMENT
33yo POD#2 s/p LTCS.  Patient is stable and doing well post-operatively.    #Elevated bilirubin  -LFTs wnl  - trending bili daily   -hepatology consulted; recs appreciated  -hepatology labs sent  -MRI abdomen pending    #Postpartum    - Continue regular diet  - Increase ambulation  - Continue motrin, tylenol, oxycodone PRN for pain control.        Brook Crabtree MD PGY3

## 2022-10-16 NOTE — PROGRESS NOTE ADULT - SUBJECTIVE AND OBJECTIVE BOX
OB Progress Note: LTCS, POD#2    S: 31yo w hx of chronic hep B now POD#2 s/p LTCS. Pain well controlled, tolerating regular diet, passing flatus, voiding spontaneously, ambulating without difficulty. Denies heavy vaginal bleeding, CP/SOB, lightheadedness/dizziness, nausea/vomiting,     O:  Vitals:  Vital Signs Last 24 Hrs  T(C): 36.8 (15 Oct 2022 21:45), Max: 36.8 (15 Oct 2022 21:45)  T(F): 98.3 (15 Oct 2022 21:45), Max: 98.3 (15 Oct 2022 21:45)  HR: 63 (15 Oct 2022 21:45) (63 - 78)  BP: 131/78 (15 Oct 2022 21:45) (111/64 - 134/80)  BP(mean): --  RR: 17 (15 Oct 2022 21:45) (17 - 18)  SpO2: 99% (15 Oct 2022 21:45) (98% - 99%)    Parameters below as of 15 Oct 2022 21:45  Patient On (Oxygen Delivery Method): room air        MEDICATIONS  (STANDING):  diphtheria/tetanus/pertussis (acellular) Vaccine (ADAcel) 0.5 milliLiter(s) IntraMuscular once  heparin   Injectable 5000 Unit(s) SubCutaneous every 12 hours  ibuprofen  Tablet. 600 milliGRAM(s) Oral every 6 hours  lactated ringers. 1000 milliLiter(s) (75 mL/Hr) IV Continuous <Continuous>      MEDICATIONS  (PRN):  diphenhydrAMINE 25 milliGRAM(s) Oral every 6 hours PRN Pruritus  lanolin Ointment 1 Application(s) Topical every 6 hours PRN Sore Nipples  magnesium hydroxide Suspension 30 milliLiter(s) Oral two times a day PRN Constipation  oxyCODONE    IR 5 milliGRAM(s) Oral every 3 hours PRN Moderate to Severe Pain (4-10)  oxyCODONE    IR 5 milliGRAM(s) Oral once PRN Moderate to Severe Pain (4-10)  simethicone 80 milliGRAM(s) Chew every 4 hours PRN Gas      Labs:  Blood type: O Negative  Rubella IgG: RPR: Negative                          8.4<L>   12.42<H> >-----------< 159    ( 10-15 @ 06:16 )             26.3<L>                        11.1<L>   17.30<H> >-----------< 172    ( 10-13 @ 17:30 )             33.4<L>    10-15-22 @ 06:16      137  |  106  |  18  ----------------------------<  73  4.1   |  20<L>  |  0.68    10-14-22 @ 05:50      134<L>  |  105  |  12  ----------------------------<  113<H>  4.1   |  18<L>  |  0.62    10-13-22 @ 17:30      136  |  104  |  10  ----------------------------<  86  3.8   |  19<L>  |  0.56        Ca    8.5      15 Oct 2022 06:16  Ca    8.3<L>      14 Oct 2022 05:50  Ca    8.5      13 Oct 2022 17:30    TPro  5.2<L>  /  Alb  2.3<L>  /  TBili  8.1<H>  /  DBili  x   /  AST  23  /  ALT  13  /  AlkPhos  105  10-15-22 @ 06:16  TPro  5.0<L>  /  Alb  2.3<L>  /  TBili  9.7<H>  /  DBili  8.6<H>  /  AST  24  /  ALT  11  /  AlkPhos  136<H>  10-14-22 @ 05:50  TPro  5.8<L>  /  Alb  2.8<L>  /  TBili  10.7<H>  /  DBili  x   /  AST  20  /  ALT  13  /  AlkPhos  174<H>  10-13-22 @ 17:30          PE:  General: NAD  CV: RRR   Resp: CTAB  Abdomen: Soft, appropriately tender, Fundus firm incision c/d/i.  : Nl lochia  Extremities: No erythema, no pitting edema

## 2022-10-17 VITALS
RESPIRATION RATE: 18 BRPM | HEART RATE: 62 BPM | SYSTOLIC BLOOD PRESSURE: 128 MMHG | TEMPERATURE: 98 F | OXYGEN SATURATION: 100 % | DIASTOLIC BLOOD PRESSURE: 81 MMHG

## 2022-10-17 LAB — BILE AC FLD-MCNC: <1 UMOL/L — SIGNIFICANT CHANGE UP (ref 0–10)

## 2022-10-17 PROCEDURE — 74182 MRI ABDOMEN W/CONTRAST: CPT | Mod: 26

## 2022-10-17 RX ADMIN — HEPARIN SODIUM 5000 UNIT(S): 5000 INJECTION INTRAVENOUS; SUBCUTANEOUS at 00:29

## 2022-10-17 RX ADMIN — Medication 600 MILLIGRAM(S): at 05:13

## 2022-10-17 RX ADMIN — Medication 600 MILLIGRAM(S): at 05:43

## 2022-10-17 RX ADMIN — Medication 600 MILLIGRAM(S): at 13:39

## 2022-10-17 RX ADMIN — Medication 600 MILLIGRAM(S): at 00:07

## 2022-10-17 RX ADMIN — Medication 600 MILLIGRAM(S): at 00:37

## 2022-10-17 NOTE — PROGRESS NOTE ADULT - ATTENDING COMMENTS
feeling well.  Meeting criteria for discharge  Awaiting results of MRI of liver    Planning discharge home today. PEC w/o SF  -stable without meds    feeling well.  Meeting criteria for discharge  Awaiting results of MRI of liver    Planning discharge home today.

## 2022-10-17 NOTE — PROGRESS NOTE ADULT - ASSESSMENT
33yo POD#3 s/p LTCS due to arrest.  Patient is stable and doing well post-operatively.    #Elevated bilirubin in the setting of chronic hepatitis B  -LFTs wnl  - trending bili daily: now downtrending (10.7->9.7->8.1)   -hepatology consulted; recs appreciated  -hepatology labs sent  -MRI abdomen pending    #Postpartum    - Continue regular diet  - Increase ambulation  - Continue motrin, tylenol, oxycodone PRN for pain control.      Vielka Clayton PGY-3

## 2022-10-17 NOTE — PROGRESS NOTE ADULT - SUBJECTIVE AND OBJECTIVE BOX
Chief Complaint:  Patient is a 32y old  Female who presents with a chief complaint of induction for chronic Hep B (16 Oct 2022 04:19)      Interval Events: feels well today, ready to go home  - MRI this AM, results below      Hospital Medications:  diphenhydrAMINE 25 milliGRAM(s) Oral every 6 hours PRN  diphtheria/tetanus/pertussis (acellular) Vaccine (ADAcel) 0.5 milliLiter(s) IntraMuscular once  heparin   Injectable 5000 Unit(s) SubCutaneous every 12 hours  ibuprofen  Tablet. 600 milliGRAM(s) Oral every 6 hours  lanolin Ointment 1 Application(s) Topical every 6 hours PRN  magnesium hydroxide Suspension 30 milliLiter(s) Oral two times a day PRN  oxyCODONE    IR 5 milliGRAM(s) Oral every 3 hours PRN  oxyCODONE    IR 5 milliGRAM(s) Oral once PRN  simethicone 80 milliGRAM(s) Chew every 4 hours PRN      PMHX/PSHX:  Hepatitis B    CAD (coronary artery disease)    No significant past surgical history            ROS:     General:  No weight loss, fevers, chills, night sweats, fatigue   Eyes:  No vision changes  ENT:  No sore throat, pain, runny nose  CV:  No chest pain, palpitations, dizziness   Resp:  No SOB, cough, wheezing  GI:  See HPI  :  No burning with urination, hematuria  Muscle:  No pain, weakness  Neuro:  No weakness/tingling, memory problems  Psych:  No fatigue, insomnia, mood problems, depression  Heme:  No easy bruisability  Skin:  No rash, edema      PHYSICAL EXAM:     GENERAL:  Well developed, no distress  HEENT:  NC/AT,  conjunctivae clear, scleral icterus  CHEST:  Full & symmetric excursion, no increased effort w/ respirations  HEART:  Regular rhythm & rate  ABDOMEN:  Soft, non-tender, non-distended  EXTREMITIES:  no LE  edema  SKIN:  No rash/erythema/ecchymoses/petechiae/wounds/jaundice  NEURO:  Alert, oriented    Vital Signs:  Vital Signs Last 24 Hrs  T(C): 37 (17 Oct 2022 10:20), Max: 37 (17 Oct 2022 10:20)  T(F): 98.6 (17 Oct 2022 10:20), Max: 98.6 (17 Oct 2022 10:20)  HR: 56 (17 Oct 2022 10:20) (55 - 61)  BP: 121/80 (17 Oct 2022 10:20) (118/64 - 136/82)  BP(mean): --  RR: 17 (17 Oct 2022 10:20) (17 - 18)  SpO2: 100% (17 Oct 2022 10:20) (98% - 100%)    Parameters below as of 17 Oct 2022 10:20  Patient On (Oxygen Delivery Method): room air      Daily     Daily     LABS:                    Imaging:    < from: MR Abdomen w/ IV Cont (10.17.22 @ 11:38) >  FINDINGS:  LOWER CHEST: Trace bilateral pleural effusions.    LIVER: Hepatomegaly. No focal liver lesions. Portal and hepatic veins are   patent.  BILE DUCTS: Normal caliber.  GALLBLADDER: Within normal limits.  SPLEEN: Within normal limits.  PANCREAS: Within normal limits.  ADRENALS: Within normal limits.  KIDNEYS/URETERS: Within normal limits.    VISUALIZED PORTIONS:  REPRODUCTIVE ORGANS: Postpartum uterus.  BOWEL: Within normal limits.  PERITONEUM: No ascites.  VESSELS: Within normal limits.  RETROPERITONEUM/LYMPH NODES: No lymphadenopathy.  ABDOMINAL WALL: Within normal limits.  BONES: Within normal limits.    IMPRESSION:  No acute findings within the abdomen.  Enlarged which may reflect pregnancy-related change.    < end of copied text >

## 2022-10-17 NOTE — PROGRESS NOTE ADULT - SUBJECTIVE AND OBJECTIVE BOX
R3 Progress Note POD#3    Patient seen and examined at bedside, no acute overnight events. No acute complaints, pain well controlled. Patient is ambulating and tolerating regular diet. Voiding spontaneously, passing flatus. Denies CP, SOB, N/V, HA, blurred vision, epigastric pain.    Vital Signs Last 24 Hours  T(C): 36.6 (10-17-22 @ 05:37), Max: 36.8 (10-16-22 @ 09:59)  HR: 61 (10-17-22 @ 05:37) (55 - 72)  BP: 136/82 (10-17-22 @ 05:37) (118/64 - 136/82)  RR: 18 (10-17-22 @ 05:37) (18 - 18)  SpO2: 100% (10-17-22 @ 05:37) (98% - 100%)    I&O's Summary      Physical Exam:  General: NAD  Abdomen: Soft, non-tender, non-distended, fundus firm  Incision: Pfannenstiel incision CDI, subcuticular suture closure  Pelvic: Lochia wnl    Labs:    Blood Type: O Negative  Antibody Screen: Negative  RPR: Negative               8.4    12.42 )-----------( 159      ( 10-15 @ 06:16 )             26.3                11.1   17.30 )-----------( 172      ( 10-13 @ 17:30 )             33.4                11.1   14.62 )-----------( 177      ( 10-13 @ 01:45 )             32.5         MEDICATIONS  (STANDING):  diphtheria/tetanus/pertussis (acellular) Vaccine (ADAcel) 0.5 milliLiter(s) IntraMuscular once  heparin   Injectable 5000 Unit(s) SubCutaneous every 12 hours  ibuprofen  Tablet. 600 milliGRAM(s) Oral every 6 hours  lactated ringers. 1000 milliLiter(s) (75 mL/Hr) IV Continuous <Continuous>    MEDICATIONS  (PRN):  diphenhydrAMINE 25 milliGRAM(s) Oral every 6 hours PRN Pruritus  lanolin Ointment 1 Application(s) Topical every 6 hours PRN Sore Nipples  magnesium hydroxide Suspension 30 milliLiter(s) Oral two times a day PRN Constipation  oxyCODONE    IR 5 milliGRAM(s) Oral every 3 hours PRN Moderate to Severe Pain (4-10)  oxyCODONE    IR 5 milliGRAM(s) Oral once PRN Moderate to Severe Pain (4-10)  simethicone 80 milliGRAM(s) Chew every 4 hours PRN Gas

## 2022-10-17 NOTE — PROGRESS NOTE ADULT - ASSESSMENT
32F with hx of hep B, 38 weeks pregnant presents for a scheduled IOL for elevated bilirubin & chronic Hepatitis B with chronically elevated bilirubin with concern for bilirubin metabolism defect.    Impression:  #Hyperbilirubinemia - chronic since her 20s per patient. Worsened during pregnancy. This can be seen in hep B in the setting of cirrhosis but on US and labs, there is low concern for cirrhosis and portal HTN. Unlikely 2/2 active inflammation from hep B given normal transaminases. Elevation of alk phos likely from placenta. Given elevation of conjugated fraction and chronicity concern for Rotor syndrome.  #Chronic hep B - low DNA PCR in May not requiring treatment in pregnancy    Recommendation:  - will f/u labs and call patient with results  - patient to f/u with Dr Godfrey on d/c   - no contraindication to d/c from hepatology stand point    Note not finalized until signed by attending.    Gita Jones PGY-6  Gastroenterology/Hepatology Fellow  Pager #[phone] ([facility]) or [phone] ([place])  Available on [employer] Teams.  Please contact on-call GI fellow via answering service ([phone]) after 5pm and before 8am, and on weekends.

## 2022-10-17 NOTE — CHART NOTE - NSCHARTNOTEFT_GEN_A_CORE
PA Note    Spoke with Hepatology. Team rounded on patient and saw MR Abdomen results. Patient is cleared for discharge per team. Hepatology will contact patient with the rest of the hepatology panel results.    Radha Martinez PA-C

## 2022-10-18 LAB
COPROPORPHYRIN I - RANDOM URINE: 323 UG/L — HIGH (ref 0–15)
COPROPORPHYRIN III - RANDOM URINE: 26 UG/L — SIGNIFICANT CHANGE UP (ref 0–49)
HEPTA-CP UR-MCNC: 4 UG/L — HIGH (ref 0–2)
HEXA-CP UR-MCNC: <1 UG/L — SIGNIFICANT CHANGE UP (ref 0–1)
PENTACARBOXYPORPHRIN, RANDOM URINE: 3 UG/L — HIGH (ref 0–2)
UROPORPHYRIN I - RANDOM URINE: 21 UG/L — HIGH (ref 0–20)

## 2022-10-20 LAB
BILE AC SERPL-SCNC: 0.7 UMOL/L — SIGNIFICANT CHANGE UP
CDCAE SER-MCNC: 0.1 UMOL/L — SIGNIFICANT CHANGE UP
CHOLATE SER-MCNC: 0.3 UMOL/L — SIGNIFICANT CHANGE UP
COPRO 24H UR-MCNC: 306 UG/L — SIGNIFICANT CHANGE UP
COPROPORPHYRIN (CP) III: 40 UG/L — SIGNIFICANT CHANGE UP
COPROPORPHYRIN I - 24 HOUR URINE: 520 UG/24 HR — HIGH (ref 0–24)
COPROPORPHYRIN III - 24 HOUR URINE: 68 UG/24 HR — SIGNIFICANT CHANGE UP (ref 0–74)
DO-CHOLATE SER-MCNC: <0.1 UMOL/L — SIGNIFICANT CHANGE UP
HEPTA-CP UR-MCNC: 7 UG/24 HR — HIGH (ref 0–4)
HEPTACARBOXYL (7-CP): 4 UG/L — SIGNIFICANT CHANGE UP
HEXA-CP UR-MCNC: <2 UG/24 HR — SIGNIFICANT CHANGE UP (ref 0–1)
HEXACARBOXYL (6-CP): <1 UG/L — SIGNIFICANT CHANGE UP
PENTA-CPI 24H STL-MRATE: 7 UG/24 HR — HIGH (ref 0–4)
PENTACARBOXYL (5-CP): 4 UG/L — SIGNIFICANT CHANGE UP
UROPOR 24H UR-MCNC: 18 UG/L — SIGNIFICANT CHANGE UP
UROPORPHYRIN I - 24 HOUR URINE: 31 UG/24 HR — HIGH (ref 0–24)
URSODEOXYCHOLATE SERPL-SCNC: 0.3 UMOL/L — SIGNIFICANT CHANGE UP

## 2022-10-21 ENCOUNTER — NON-APPOINTMENT (OUTPATIENT)
Age: 32
End: 2022-10-21

## 2022-10-26 LAB — SURGICAL PATHOLOGY STUDY: SIGNIFICANT CHANGE UP

## 2023-10-02 RX ORDER — ERGOCALCIFEROL 1.25 MG/1
1 CAPSULE ORAL
Qty: 0 | Refills: 0 | DISCHARGE

## 2023-10-02 RX ORDER — FERROUS SULFATE 325(65) MG
1 TABLET ORAL
Qty: 0 | Refills: 0 | DISCHARGE

## 2023-11-08 NOTE — PROVIDER CONTACT NOTE (CRITICAL VALUE NOTIFICATION) - DATE AND TIME:
Pt bib dad. Per pt she injured her tailbone after doing a split without stretching on halloween. Pain still hurts. Right wrist started hurting a few days ago and is sore. Pt thinks she may have slept on it funny but should be better by now. No trauma. Pt has been wrapping it at home. MSPs intact. No meds PTA  
27-May-2022 08:45

## 2023-12-05 NOTE — LACTATION INITIAL EVALUATION - HYPOTHYROID
Hypertension is slightly elevated.  Decrease table salt and foods high in salt.  Weight loss.  Increase activity daily.  Continue Amlodipine 10 mg daily, Hydralazine 100mg TID, Metoprolol Tartrate 25 mg BID and Losartan 25mg daily.  Blood pressure will be reassessed in 3 months.     no

## 2025-04-16 NOTE — CONSULT NOTE ADULT - ASSESSMENT
32F with hx of hep B, 38 weeks pregnant presents for a scheduled IOL for elevated bilirubin & chronic Hepatitis B with chronically elevated bilirubin with concern for bilirubin metabolism defect.    Impression:  #Hyperbilirubinemia - chronic since her 20s per patient. Worsened during pregnancy. This can be seen in hep B in the setting of cirrhosis but on US and labs, there is low concern for cirrhosis and portal HTN. Unlikely 2/2 active inflammation from hep B given normal transaminases. Elevation of alk phos likely from placenta. Given elevation of conjugated fraction and chronicity concern for Rotor syndrome.  #Chronic hep B - low DNA PCR in May not requiring treatment in pregnancy    Recommendation:  - send urine coproporhyrin (I and II, total) this was ordered  - send bile acid level and bile acid fractionated  - send hep B e Ag, eAb, DNA PCR, sAg, sAb, cIgM  - please obtain MR with contrast after delivery (unable to get done outpatient)  - monitor CMP daily    Note not finalized until signed by attending.    Gita Jones PGY-6  Gastroenterology/Hepatology Fellow  Pager #[phone] ([facility]) or [phone] ([place])  Available on [employer] Teams.  Please contact on-call GI fellow via answering service ([phone]) after 5pm and before 8am, and on weekends.            Detail Level: Zone Photo Preface (Leave Blank If You Do Not Want): Photographs were obtained today